# Patient Record
Sex: FEMALE | Race: WHITE | NOT HISPANIC OR LATINO | Employment: FULL TIME | URBAN - NONMETROPOLITAN AREA
[De-identification: names, ages, dates, MRNs, and addresses within clinical notes are randomized per-mention and may not be internally consistent; named-entity substitution may affect disease eponyms.]

---

## 2017-05-08 ENCOUNTER — APPOINTMENT (OUTPATIENT)
Dept: GENERAL RADIOLOGY | Facility: HOSPITAL | Age: 62
End: 2017-05-08

## 2017-05-08 ENCOUNTER — HOSPITAL ENCOUNTER (EMERGENCY)
Facility: HOSPITAL | Age: 62
Discharge: HOME | End: 2017-05-08
Attending: EMERGENCY MEDICINE | Admitting: EMERGENCY MEDICINE

## 2017-05-08 VITALS
HEART RATE: 59 BPM | TEMPERATURE: 98.4 F | BODY MASS INDEX: 28.12 KG/M2 | HEIGHT: 66 IN | DIASTOLIC BLOOD PRESSURE: 56 MMHG | WEIGHT: 175 LBS | RESPIRATION RATE: 20 BRPM | OXYGEN SATURATION: 95 % | SYSTOLIC BLOOD PRESSURE: 107 MMHG

## 2017-05-08 DIAGNOSIS — S83.92XA SPRAIN OF KNEE/LEG, LEFT, INITIAL ENCOUNTER: Primary | ICD-10-CM

## 2017-05-08 DIAGNOSIS — S82.832A CLOSED FRACTURE OF PROXIMAL END OF LEFT FIBULA, UNSPECIFIED FRACTURE MORPHOLOGY, INITIAL ENCOUNTER: ICD-10-CM

## 2017-05-08 PROCEDURE — 99284 EMERGENCY DEPT VISIT MOD MDM: CPT

## 2017-05-08 PROCEDURE — 73564 X-RAY EXAM KNEE 4 OR MORE: CPT

## 2017-05-08 RX ORDER — IBUPROFEN 600 MG/1
600 TABLET ORAL ONCE
Status: COMPLETED | OUTPATIENT
Start: 2017-05-08 | End: 2017-05-08

## 2017-05-08 RX ORDER — IBUPROFEN 600 MG/1
600 TABLET ORAL EVERY 8 HOURS PRN
Qty: 30 TABLET | Refills: 0 | Status: SHIPPED | OUTPATIENT
Start: 2017-05-08

## 2017-05-08 RX ADMIN — IBUPROFEN 600 MG: 600 TABLET ORAL at 22:16

## 2017-05-26 ENCOUNTER — HOSPITAL ENCOUNTER (OUTPATIENT)
Dept: PHYSICAL THERAPY | Facility: HOSPITAL | Age: 62
Discharge: HOME | End: 2017-05-26
Admitting: PHYSICAL THERAPIST

## 2017-05-26 DIAGNOSIS — S83.512A LEFT ACL TEAR: Primary | ICD-10-CM

## 2017-05-26 DIAGNOSIS — S82.832D CLOSED FRACTURE FIBULA, HEAD, LEFT, WITH ROUTINE HEALING, SUBSEQUENT ENCOUNTER: ICD-10-CM

## 2017-05-26 PROCEDURE — 97110 THERAPEUTIC EXERCISES: CPT | Performed by: PHYSICAL THERAPIST

## 2017-05-26 PROCEDURE — G0283 ELEC STIM OTHER THAN WOUND: HCPCS | Performed by: PHYSICAL THERAPIST

## 2017-05-26 PROCEDURE — 97161 PT EVAL LOW COMPLEX 20 MIN: CPT | Performed by: PHYSICAL THERAPIST

## 2017-05-30 ENCOUNTER — HOSPITAL ENCOUNTER (OUTPATIENT)
Dept: PHYSICAL THERAPY | Facility: HOSPITAL | Age: 62
Setting detail: THERAPIES SERIES
Discharge: HOME | End: 2017-05-30

## 2017-05-30 DIAGNOSIS — S82.832D CLOSED FRACTURE FIBULA, HEAD, LEFT, WITH ROUTINE HEALING, SUBSEQUENT ENCOUNTER: ICD-10-CM

## 2017-05-30 DIAGNOSIS — S83.512A LEFT ACL TEAR: Primary | ICD-10-CM

## 2017-05-30 PROCEDURE — G0283 ELEC STIM OTHER THAN WOUND: HCPCS

## 2017-05-30 PROCEDURE — 97110 THERAPEUTIC EXERCISES: CPT

## 2017-05-31 ENCOUNTER — TRANSCRIBE ORDERS (OUTPATIENT)
Dept: PHYSICAL THERAPY | Facility: HOSPITAL | Age: 62
End: 2017-05-31

## 2017-05-31 DIAGNOSIS — S82.832A CLOSED FRACTURE OF DISTAL END OF FIBULA WITH TIBIA, LEFT, INITIAL ENCOUNTER: ICD-10-CM

## 2017-05-31 DIAGNOSIS — S83.512A SPRAIN OF ANTERIOR CRUCIATE LIGAMENT OF LEFT KNEE, INITIAL ENCOUNTER: Primary | ICD-10-CM

## 2017-05-31 DIAGNOSIS — S82.302A CLOSED FRACTURE OF DISTAL END OF FIBULA WITH TIBIA, LEFT, INITIAL ENCOUNTER: ICD-10-CM

## 2017-06-01 ENCOUNTER — HOSPITAL ENCOUNTER (OUTPATIENT)
Dept: PHYSICAL THERAPY | Facility: HOSPITAL | Age: 62
Setting detail: THERAPIES SERIES
Discharge: HOME | End: 2017-06-01

## 2017-06-01 DIAGNOSIS — S83.512A LEFT ACL TEAR: Primary | ICD-10-CM

## 2017-06-01 DIAGNOSIS — S82.832D CLOSED FRACTURE FIBULA, HEAD, LEFT, WITH ROUTINE HEALING, SUBSEQUENT ENCOUNTER: ICD-10-CM

## 2017-06-01 PROCEDURE — G0283 ELEC STIM OTHER THAN WOUND: HCPCS

## 2017-06-01 PROCEDURE — 97110 THERAPEUTIC EXERCISES: CPT

## 2017-06-01 NOTE — THERAPY TREATMENT NOTE
Outpatient Physical Therapy Ortho Treatment Note  [facility]     Patient Name: Silvia Watt  : 1955  MRN: [id number]  Today's Date: 2017      Visit Date: 2017  Visits 3/3. Jose Cruz 17. MD jolley 17.   Visit Dx:    ICD-10-CM ICD-9-CM   1. Left ACL tear S83.512A 844.2   2. Closed fracture fibula, head, left, with routine healing, subsequent encounter S82.832D V54.16       There is no problem list on file for this patient.       No past medical history on file.     No past surgical history on file.          PT Ortho       17 0800    Subjective Comments    Subjective Comments Pt reports no knee pain. Mild increased symptoms post prev treatment. She is doing HEP.   -JW    Precautions and Contraindications    Precautions/Limitations non-weight bearing status  -JW    Subjective Pain    Able to rate subjective pain? yes  -JW    Pre-Treatment Pain Level 0  -JW    Post-Treatment Pain Level 0  -JW    Posture/Observations    Posture/Observations Comments NWB. Amb with B crutches. Minimal ext lag with SLR.   -JW      17 1400    Subjective Comments    Subjective Comments Patient states that she will have surgery to repair her ACL  -CP    Precautions and Contraindications    Precautions/Limitations non-weight bearing status  -CP    Subjective Pain    Able to rate subjective pain? yes  -CP    Pre-Treatment Pain Level 2  -CP    Posture/Observations    Posture/Observations Comments NWB with crutches  -CP      User Key  (r) = Recorded By, (t) = Taken By, (c) = Cosigned By    Initials Name Provider Type     Michael Nichole PTA Physical Therapy Assistant    KENISHA Renteria PTA Physical Therapy Assistant                            PT Assessment/Plan       17 0800       PT Assessment    Functional Limitations Impaired gait;Decreased safety during functional activities;Limitation in home management;Performance in work activities;Performance in leisure activities;Limitations  "in community activities  -JW     Impairments Gait;Balance;Endurance;Muscle strength;Pain;Range of motion  -JW     Assessment Comments Pt is loki PT well thus far. Good loki of TE changes today. One goal met.    -JW     PT Plan    PT Frequency 2x/week  -JW     Predicted Duration of Therapy Intervention (days/wks) 4 weeks  -JW     PT Plan Comments Cont PT per POC.   -JW       User Key  (r) = Recorded By, (t) = Taken By, (c) = Cosigned By    Initials Name Provider Type    JUNIOR Nichole PTA Physical Therapy Assistant                Modalities       06/01/17 0900          Ice    Ice Applied Yes  -JW      Location left knee  -JW      Rx Minutes 15 mins  -JW      Ice S/P Rx Yes  -JW      ELECTRICAL STIMULATION    Attended/Unattended Unattended  -JW      Stimulation Type IFC  -JW      Location/Electrode Placement/Other left knee  -JW      Rx Minutes 15 mins  -JW        User Key  (r) = Recorded By, (t) = Taken By, (c) = Cosigned By    Initials Name Provider Type    JUNIOR Nichole PTA Physical Therapy Assistant                Exercises       06/01/17 0800          Subjective Comments    Subjective Comments Pt reports no knee pain. Mild increased symptoms post prev treatment. She is doing HEP.   -JW      Subjective Pain    Able to rate subjective pain? yes  -JW      Pre-Treatment Pain Level 0  -JW      Post-Treatment Pain Level 0  -JW      Exercise 1    Exercise Name 1 Pro II for ROM with UE/LE  -JW      Time (Minutes) 1 10  -JW      Exercise 2    Exercise Name 2 QS  -JW      Reps 2 20  -JW      Exercise 3    Exercise Name 3 SAQ  -JW      Reps 3 30  -JW      Exercise 4    Exercise Name 4 SLR  -JW      Reps 4 30  -JW      Exercise 5    Exercise Name 5 S/L hip abd  -JW      Reps 5 30  -JW      Exercise 6    Exercise Name 6 Prone knee flex stretch  -JW      Sets 6 2  -JW      Time (Seconds) 6 30\"  -JW      Exercise 7    Exercise Name 7 Prone HS curls  -JW      Reps 7 30  -JW      Exercise 8    Exercise Name 8 Hip add " squeeze  -JW      Reps 8 30  -JW      Exercise 9    Exercise Name 9 PF  -JW      Equipment 9 Theraband  -JW      Resistance 9 Green  -JW      Reps 9 30  -JW        User Key  (r) = Recorded By, (t) = Taken By, (c) = Cosigned By    Initials Name Provider Type     Michael Nichole PTA Physical Therapy Assistant                               PT OP Goals       06/01/17 0800       PT Short Term Goals    STG Date to Achieve 06/16/17  -     STG 1 Patient will be independent in home exercise program  -     STG 1 Progress Progressing  -JW     STG 2 Patient will have 120° flexion  -     STG 2 Progress Met  -JW     STG 3 Patient will independent straight leg raise without extensor lag  -     STG 3 Progress Progressing  -JW     STG 4 Patient will have 1 out of 10 resting pain  -     STG 4 Progress Progressing  -     STG 5 Patient will be able to increase weightbearing activities  -     STG 5 Progress Not Met  -     Long Term Goals    LTG Date to Achieve 07/07/17  -     LTG 1 Patient will be able to ambulate without an assistive device  -     LTG 1 Progress Not Met  -JW     LTG 2 Patient will have 135° flexion of the left knee  -     LTG 2 Progress Not Met  -JW     LTG 3 Patient have 0 resting pain  -     LTG 3 Progress Not Met  -     LTG 4 Patient will have minimal increase in swelling by end of day  -     LTG 4 Progress Not Met  -JW     LTG 5 Patient independent in exercise program  -     LTG 5 Progress Ongoing  -       User Key  (r) = Recorded By, (t) = Taken By, (c) = Cosigned By    Initials Name Provider Type     Michael Nichole PTA Physical Therapy Assistant                    Time Calculation:   Start Time: 0800  Stop Time: 0904  Time Calculation (min): 64 min  Total Timed Code Minutes- PT: 64 minute(s)    Therapy Charges for Today     Code Description Service Date Service Provider Modifiers Qty    88762925771 HC PT THER PROC EA 15 MIN 6/1/2017 Michael Nichole PTA GP 3    [id number]  HC PT ELECTRICAL STIM UNATTENDED 6/1/2017 Michael Nichole, PTA  1                    Michael Nichole, PTA  6/1/2017

## 2017-06-02 ENCOUNTER — HOSPITAL ENCOUNTER (OUTPATIENT)
Dept: PHYSICAL THERAPY | Facility: HOSPITAL | Age: 62
Setting detail: THERAPIES SERIES
Discharge: HOME | End: 2017-06-02

## 2017-06-02 DIAGNOSIS — S82.832D CLOSED FRACTURE FIBULA, HEAD, LEFT, WITH ROUTINE HEALING, SUBSEQUENT ENCOUNTER: ICD-10-CM

## 2017-06-02 DIAGNOSIS — S83.512A LEFT ACL TEAR: Primary | ICD-10-CM

## 2017-06-02 PROCEDURE — G0283 ELEC STIM OTHER THAN WOUND: HCPCS

## 2017-06-02 PROCEDURE — 97110 THERAPEUTIC EXERCISES: CPT

## 2017-06-02 NOTE — THERAPY TREATMENT NOTE
Outpatient Physical Therapy Ortho Treatment Note  [facility]     Patient Name: Silvia Watt  : 1955  MRN: [id number]  Today's Date: 2017      Visit Date: 2017     Sub imp pt. Unsure  Visit  (30)  Re 17  MD 17    Visit Dx:    ICD-10-CM ICD-9-CM   1. Left ACL tear S83.512A 844.2   2. Closed fracture fibula, head, left, with routine healing, subsequent encounter S82.832D V54.16       There is no problem list on file for this patient.       No past medical history on file.     No past surgical history on file.          PT Ortho       17 0800    Subjective Comments    Subjective Comments Pt reports no knee pain. Mild increased symptoms post prev treatment. She is doing HEP.   -    Precautions and Contraindications    Precautions/Limitations non-weight bearing status  -JW    Subjective Pain    Able to rate subjective pain? yes  -    Pre-Treatment Pain Level 0  -JW    Post-Treatment Pain Level 0  -JW    Posture/Observations    Posture/Observations Comments NWB. Amb with B crutches. Minimal ext lag with SLR.   -JW      17 1400    Subjective Comments    Subjective Comments Patient states that she will have surgery to repair her ACL  -CP    Precautions and Contraindications    Precautions/Limitations non-weight bearing status  -CP    Subjective Pain    Able to rate subjective pain? yes  -CP    Pre-Treatment Pain Level 2  -CP    Posture/Observations    Posture/Observations Comments NWB with crutches  -CP      User Key  (r) = Recorded By, (t) = Taken By, (c) = Cosigned By    Initials Name Provider Type     Michael Nichole, IZA Physical Therapy Assistant    KENISHA Renteria PTA Physical Therapy Assistant                            PT Assessment/Plan       17 1150       PT Assessment    Assessment Comments (P)  completes rx without c/o increased s/s  -ESTEE     PT Plan    PT Plan Comments (P)  cont per poc. progress LE strength, gait as loki  -ESTEE       User  Key  (r) = Recorded By, (t) = Taken By, (c) = Cosigned By    Initials Name Provider Type    ESTEE Jac[facility], [facility]                 Modalities       06/02/17 1000          Ice    Location (P)  left knee  -ESTEE      Rx Minutes (P)  15 mins  -ESTEE      Ice S/P Rx (P)  Yes  -ESTEE      ELECTRICAL STIMULATION    Attended/Unattended (P)  Unattended  -ESTEE      Stimulation Type (P)  IFC  -ESTEE      Location/Electrode Placement/Other (P)  left knee  -ESTEE      Rx Minutes (P)  15 mins  -ESTEE        User Key  (r) = Recorded By, (t) = Taken By, (c) = Cosigned By    Initials Name Provider Type    ESTEE Jacome [facility]                 Exercises       06/02/17 1000          Subjective Comments    Subjective Comments (P)  Reports knee is achy today. sore from yesterday rx.   -ESTEE      Subjective Pain    Able to rate subjective pain? (P)  yes  -ESTEE      Pre-Treatment Pain Level (P)  2  -ESTEE      Post-Treatment Pain Level (P)  2  -ESTEE      Exercise 1    Exercise Name 1 (P)  Pro II seat 11 L1  -ESTEE      Time (Minutes) 1 (P)  10  -ESTEE      Exercise 2    Exercise Name 2 (P)  QS  -ESTEE      Reps 2 (P)  20  -ESTEE      Exercise 3    Exercise Name 3 (P)  SAQ  -ESTEE      Reps 3 (P)  30  -ESTEE      Exercise 4    Exercise Name 4 (P)  SLR  -ESTEE      Reps 4 (P)  30  -ESTEE      Exercise 5    Exercise Name 5 (P)  S/L hip abd  -ESTEE      Reps 5 (P)  30  -ESTEE      Exercise 6    Exercise Name 6 (P)  prone tke  -ESTEE      Sets 6 (P)  --  -ESTEE      Reps 6 (P)  20  -ESTEE      Time (Seconds) 6 (P)  --  -ESTEE      Exercise 7    Exercise Name 7 (P)  Ham ball iso  -ESTEE      Reps 7 (P)  20  -ESTEE      Exercise 8    Exercise Name 8 (P)  Hip add squeeze  -ESTEE      Reps 8 (P)  30  -ESTEE      Exercise 9    Exercise Name 9 (P)  --  -ESTEE      Equipment 9 (P)  --  -ESTEE      Resistance 9 (P)  --  -ESTEE      Reps 9 (P)  --  -ESTEE      Exercise 10    Exercise Name 10 (P)  standing ham stretch  -ESTEE      Sets 10 (P)  3  -ESTEE      Time (Seconds) 10 (P)  30  -ESTEE      Exercise 11    Exercise Name  11 (P)  incline stretch  -ESTEE      Sets 11 (P)  3  -ESTEE      Time (Seconds) 11 (P)  30  -ESTEE        User Key  (r) = Recorded By, (t) = Taken By, (c) = Cosigned By    Initials Name Provider Type    ESTEE Jacome [facility]                                PT OP Goals       06/02/17 1000       PT Short Term Goals    STG Date to Achieve (P)  06/16/17  -ESTEE     STG 1 (P)  Patient will be independent in home exercise program  -ESTEE     STG 1 Progress (P)  Progressing  -ESTEE     STG 2 (P)  Patient will have 120° flexion  -ESTEE     STG 2 Progress (P)  Met  -ESTEE     STG 3 (P)  Patient will independent straight leg raise without extensor lag  -ESTEE     STG 3 Progress (P)  Progressing  -ESTEE     STG 4 (P)  Patient will have 1 out of 10 resting pain  -ESTEE     STG 4 Progress (P)  Progressing  -ESTEE     STG 5 (P)  Patient will be able to increase weightbearing activities  -ESTEE     STG 5 Progress (P)  Not Met  -ESTEE     Long Term Goals    LTG Date to Achieve (P)  07/07/17  -ESTEE     LTG 1 (P)  Patient will be able to ambulate without an assistive device  -ESTEE     LTG 1 Progress (P)  Not Met  -ESTEE     LTG 2 (P)  Patient will have 135° flexion of the left knee  -ESTEE     LTG 2 Progress (P)  Not Met  -ESTEE     LTG 3 (P)  Patient have 0 resting pain  -ESTEE     LTG 3 Progress (P)  Not Met  -ESTEE     LTG 4 (P)  Patient will have minimal increase in swelling by end of day  -ESTEE     LTG 4 Progress (P)  Not Met  -ESTEE     LTG 5 (P)  Patient independent in exercise program  -ESTEE     LTG 5 Progress (P)  Ongoing  -ESTEE       User Key  (r) = Recorded By, (t) = Taken By, (c) = Cosigned By    Initials Name Provider Type    ESTEE Jacome [facility]                     Time Calculation:   Start Time: (P) 1059  Stop Time: (P) 1203  Time Calculation (min): (P) 64 min  Total Timed Code Minutes- PT: (P) 49 minute(s)    Therapy Charges for Today     Code Description Service Date Service Provider Modifiers Qty    50743405670 HC PT THER SUPP EA 15 MIN 6/2/2017 Semaj IVERSON  Ayaz ATC  1    90371504220 HC PT ELECTRICAL STIM UNATTENDED 6/2/2017 Semaj Jacome ATC  1    43675100072 HC PT THER PROC EA 15 MIN 6/2/2017 Semaj Jacome, ATC  3                    Semaj Jacome ATC  6/2/2017

## 2017-06-05 ENCOUNTER — HOSPITAL ENCOUNTER (OUTPATIENT)
Dept: PHYSICAL THERAPY | Facility: HOSPITAL | Age: 62
Setting detail: THERAPIES SERIES
Discharge: HOME | End: 2017-06-05

## 2017-06-05 DIAGNOSIS — S83.512A LEFT ACL TEAR: Primary | ICD-10-CM

## 2017-06-05 PROCEDURE — G0283 ELEC STIM OTHER THAN WOUND: HCPCS

## 2017-06-05 PROCEDURE — 97110 THERAPEUTIC EXERCISES: CPT

## 2017-06-05 NOTE — THERAPY TREATMENT NOTE
Outpatient Physical Therapy Ortho Treatment Note  [facility]     Patient Name: Silvia Watt  : 1955  MRN: [id number]  Today's Date: 2017      Visit Date: 2017  Pt.  Has attended 5/5 visits  Some subjective improvement  Jose Cruz 17  MD 17    Visit Dx:    ICD-10-CM ICD-9-CM   1. Left ACL tear S83.512A 844.2       There is no problem list on file for this patient.       No past medical history on file.     No past surgical history on file.                          PT Assessment/Plan       17 0845       PT Assessment    Assessment Comments did well with incline S and toe raises some cues for minisquat correct completion but needed to stop the weight shifts due to increased pain.  Pt noted that it popped and her knee hurt.    -SP     PT Plan    PT Frequency 2x/week  -SP     PT Plan Comments continue with POC  monitor response with WB work and slow progression with CKC activities may hold on these (weight shifts)  -SP       User Key  (r) = Recorded By, (t) = Taken By, (c) = Cosigned By    Initials Name Provider Type    SP So Carcamo PTA Physical Therapy Assistant                Modalities       17 0800          Subjective Pain    Post-Treatment Pain Level 4  -SP      Ice    Ice Applied Yes  -SP      Location left knee  -SP      Rx Minutes Other:  -SP      Ice S/P Rx Yes  -SP      ELECTRICAL STIMULATION    Attended/Unattended Unattended  -SP      Stimulation Type IFC  -SP      Location/Electrode Placement/Other left knee  -SP      Rx Minutes --   20 min  -SP        User Key  (r) = Recorded By, (t) = Taken By, (c) = Cosigned By    Initials Name Provider Type    CLEVE Carcamo PTA Physical Therapy Assistant                Exercises       17 0800          Subjective Comments    Subjective Comments Notes she wears her brace when she is up uses B crutches  -SP      Subjective Pain    Able to rate subjective pain? yes  -SP      Pre-Treatment Pain Level 0   -SP      Post-Treatment Pain Level 4  -SP      Exercise 1    Exercise Name 1 Pro 2 seat 11 Level 2  -SP      Time (Minutes) 1 10 min  -SP      Exercise 2    Exercise Name 2 Incline S  -SP      Reps 2 3  -SP      Time (Seconds) 2 30 sec  -SP      Exercise 3    Exercise Name 3 toe raises  -SP      Sets 3 2  -SP      Reps 3 10  -SP      Exercise 4    Exercise Name 4 minisquats  -SP      Sets 4 2  -SP      Reps 4 10  -SP      Exercise 5    Exercise Name 5 attempted weight shifts but after 5 pain increased and this activity was stoped  -SP      Reps 5 5  -SP        User Key  (r) = Recorded By, (t) = Taken By, (c) = Cosigned By    Initials Name Provider Type    CLEVE Carcamo, PTA Physical Therapy Assistant                               PT OP Goals       06/05/17 0857 06/05/17 0800    PT Short Term Goals    STG Date to Achieve  06/16/17  -SP    STG 1  Patient will be independent in home exercise program  -SP    STG 1 Progress  Progressing  -SP    STG 2  Patient will have 120° flexion  -SP    STG 2 Progress  Met  -SP    STG 3  Patient will independent straight leg raise without extensor lag  -SP    STG 3 Progress  Progressing  -SP    STG 4  Patient will have 1 out of 10 resting pain  -SP    STG 4 Progress  Met  -SP    STG 5  Patient will be able to increase weightbearing activities  -SP    STG 5 Progress  Not Met  -SP    Long Term Goals    LTG Date to Achieve  07/07/17  -SP    LTG 1  Patient will be able to ambulate without an assistive device  -SP    LTG 1 Progress  Not Met  -SP    LTG 2  Patient will have 135° flexion of the left knee  -SP    LTG 2 Progress  Not Met  -SP    LTG 3  Patient have 0 resting pain  -SP    LTG 3 Progress  Progressing  -SP    LTG 4  Patient will have minimal increase in swelling by end of day  -SP    LTG 4 Progress  Not Met  -SP    LTG 5  Patient independent in exercise program  -SP    LTG 5 Progress  Ongoing  -SP    Time Calculation    PT Goal Re-Cert Due Date 06/22/17  -SP 06/16/17  -SP       User Key  (r) = Recorded By, (t) = Taken By, (c) = Cosigned By    Initials Name Provider Type    SP So Carcamo PTA Physical Therapy Assistant                    Time Calculation:   Start Time: 0800  Stop Time: 0900  Time Calculation (min): 60 min  PT Non-Billable Time (min): 20 min  Total Timed Code Minutes- PT: 40 minute(s)    Therapy Charges for Today     Code Description Service Date Service Provider Modifiers Qty    08444809227 HC PT THER SUPP EA 15 MIN 6/5/2017 So Carcamo PTA GP 1    11171905385 HC PT ELECTRICAL STIM UNATTENDED 6/5/2017 So Carcamo, PTA  1    90493113906 HC PT THER PROC EA 15 MIN 6/5/2017 So Carcamo PTA GP 2                    So Carcamo PTA  6/5/2017

## 2017-06-07 ENCOUNTER — HOSPITAL ENCOUNTER (OUTPATIENT)
Dept: PHYSICAL THERAPY | Facility: HOSPITAL | Age: 62
Setting detail: THERAPIES SERIES
Discharge: HOME | End: 2017-06-07

## 2017-06-07 DIAGNOSIS — S83.512A LEFT ACL TEAR: Primary | ICD-10-CM

## 2017-06-07 PROCEDURE — G0283 ELEC STIM OTHER THAN WOUND: HCPCS

## 2017-06-07 PROCEDURE — 97110 THERAPEUTIC EXERCISES: CPT

## 2017-06-07 NOTE — THERAPY TREATMENT NOTE
Outpatient Physical Therapy Ortho Treatment Note  [facility]     Patient Name: Silvia Watt  : 1955  MRN: [id number]  Today's Date: 2017      Visit Date: 2017  Pt. Has attended 6/6 visits  Recert 17  Subjective improvement 20 percent  MD 17  Visit Dx:    ICD-10-CM ICD-9-CM   1. Left ACL tear S83.512A 844.2       There is no problem list on file for this patient.       No past medical history on file.     No past surgical history on file.                          PT Assessment/Plan       17 0847       PT Assessment    Assessment Comments Tolerates limited CKC work well and ROM is slowly increasing  Remains guarded but notes subjective improvement  -SP     PT Plan    PT Frequency 2x/week  -SP     PT Plan Comments Continue with POC increase CKC work as tolerted  -SP       User Key  (r) = Recorded By, (t) = Taken By, (c) = Cosigned By    Initials Name Provider Type    SP So Carcamo PTA Physical Therapy Assistant                Modalities       17 0800          Subjective Pain    Post-Treatment Pain Level 1  -SP      Ice    Ice Applied Yes  -SP      Location left knee  -SP      Rx Minutes --   20 min  -SP      Ice S/P Rx Yes  -SP      ELECTRICAL STIMULATION    Attended/Unattended Unattended  -SP      Stimulation Type IFC  -SP      Location/Electrode Placement/Other left knee  -SP      Rx Minutes 15 mins  -SP        User Key  (r) = Recorded By, (t) = Taken By, (c) = Cosigned By    Initials Name Provider Type    SP So Carcamo PTA Physical Therapy Assistant                Exercises       17 0800          Subjective Comments    Subjective Comments Did ok after last session could feel it but no continued pain   -SP      Subjective Pain    Able to rate subjective pain? yes  -SP      Pre-Treatment Pain Level 1  -SP      Post-Treatment Pain Level 1  -SP      Exercise 1    Exercise Name 1 Pro 2 seat 11 Level2  -SP      Time (Minutes) 1 10 min  -SP       Exercise 2    Exercise Name 2 Incline S, HS S  -SP      Reps 2 3  -SP      Time (Seconds) 2 30 sec  -SP      Exercise 3    Exercise Name 3 toe raises  -SP      Sets 3 2  -SP      Reps 3 10  -SP      Exercise 4    Exercise Name 4 minisquats  -SP      Sets 4 2  -SP      Reps 4 10  -SP      Exercise 5    Exercise Name 5 SLR flexion  -SP      Reps 5 20  -SP      Exercise 6    Exercise Name 6 seated ham sets into tball  -SP      Reps 6 15  -SP      Time (Seconds) 6 10 sec   -SP      Exercise 7    Exercise Name 7 prone knee flexion  -SP      Reps 7 20  -SP      Exercise 8    Exercise Name 8 prone hip ext  -SP      Reps 8 20  -SP      Exercise 9    Exercise Name 9 heel slides  -SP      Reps 9 15  -SP      Exercise 10    Exercise Name 10 bridge with add   -SP      Reps 10 15  -SP        User Key  (r) = Recorded By, (t) = Taken By, (c) = Cosigned By    Initials Name Provider Type    CLEVE Carcamo, PTA Physical Therapy Assistant                               PT OP Goals       06/07/17 0848 06/07/17 0800    PT Short Term Goals    STG Date to Achieve  06/16/17  -SP    STG 1  Patient will be independent in home exercise program  -SP    STG 1 Progress  Progressing  -SP    STG 2  Patient will have 120° flexion  -SP    STG 2 Progress  Met  -SP    STG 2 Progress Comments  AROM 0-122  -SP    STG 3  Patient will independent straight leg raise without extensor lag  -SP    STG 3 Progress  Met  -SP    STG 4  Patient will have 1 out of 10 resting pain  -SP    STG 4 Progress  Met  -SP    STG 5  Patient will be able to increase weightbearing activities  -SP    STG 5 Progress  Not Met  -SP    Long Term Goals    LTG Date to Achieve  07/07/17  -SP    LTG 1  Patient will be able to ambulate without an assistive device  -SP    LTG 1 Progress  Not Met  -SP    LTG 2  Patient will have 135° flexion of the left knee  -SP    LTG 2 Progress  Not Met  -SP    LTG 3  Patient have 0 resting pain  -SP    LTG 3 Progress  Progressing  -SP    LTG 4   Patient will have minimal increase in swelling by end of day  -SP    LTG 4 Progress  Progressing  -SP    LTG 5  Patient independent in exercise program  -SP    LTG 5 Progress  Ongoing  -SP    Time Calculation    PT Goal Re-Cert Due Date 06/22/17  -SP 06/22/17  -SP      User Key  (r) = Recorded By, (t) = Taken By, (c) = Cosigned By    Initials Name Provider Type    SP So Carcamo PTA Physical Therapy Assistant                    Time Calculation:   Start Time: 0800  Stop Time: 0900  Time Calculation (min): 60 min  PT Non-Billable Time (min): 20 min  Total Timed Code Minutes- PT: 40 minute(s)    Therapy Charges for Today     Code Description Service Date Service Provider Modifiers Qty    70547156231 HC PT THER SUPP EA 15 MIN 6/7/2017 So Carcamo PTA GP 1    94296285314 HC PT ELECTRICAL STIM UNATTENDED 6/7/2017 So Carcamo PTA  1    37130534031 HC PT THER PROC EA 15 MIN 6/7/2017 So Carcamo PTA GP 3                    So Carcamo PTA  6/7/2017

## 2017-06-12 ENCOUNTER — APPOINTMENT (OUTPATIENT)
Dept: PHYSICAL THERAPY | Facility: HOSPITAL | Age: 62
End: 2017-06-12

## 2017-06-13 ENCOUNTER — HOSPITAL ENCOUNTER (OUTPATIENT)
Dept: PHYSICAL THERAPY | Facility: HOSPITAL | Age: 62
Setting detail: THERAPIES SERIES
Discharge: HOME | End: 2017-06-13

## 2017-06-13 DIAGNOSIS — S83.512A LEFT ACL TEAR: Primary | ICD-10-CM

## 2017-06-13 DIAGNOSIS — S82.832D CLOSED FRACTURE FIBULA, HEAD, LEFT, WITH ROUTINE HEALING, SUBSEQUENT ENCOUNTER: ICD-10-CM

## 2017-06-13 PROCEDURE — G0283 ELEC STIM OTHER THAN WOUND: HCPCS

## 2017-06-13 PROCEDURE — 97110 THERAPEUTIC EXERCISES: CPT

## 2017-06-13 NOTE — THERAPY TREATMENT NOTE
Outpatient Physical Therapy Ortho Treatment Note  [facility]     Patient Name: Silvia Watt  : 1955  MRN: [id number]  Today's Date: 2017      Visit Date: 2017   Subjective Improvement 20%  Visits 7/8  Visits approved 30 per year  [id number]  Recert Date 2017    Left ACL tear with closed fibula head Fracture      Visit Dx:    ICD-10-CM ICD-9-CM   1. Left ACL tear S83.512A 844.2   2. Closed fracture fibula, head, left, with routine healing, subsequent encounter S82.832D V54.16       There is no problem list on file for this patient.       No past medical history on file.     No past surgical history on file.          PT Ortho       17 0800    Posture/Observations    Posture/Observations Comments WBAT amb with bilateral crutches.  Wearing knee brace  -CP    ROM (Range of Motion)    General ROM Detail L. AROM 0-122  -CP    Gait Assessment/Treatment    Gait, [place] Level conditional independence  -CP    Gait, Assistive Device axillary crutches  -CP      User Key  (r) = Recorded By, (t) = Taken By, (c) = Cosigned By    Initials Name Provider Type    CP Gabriela Renteria PTA Physical Therapy Assistant                            PT Assessment/Plan       17 0856       PT Assessment    Assessment Comments tolerated RX very well.  slight increase pain with wt. bearing activities  -CP     PT Plan    PT Frequency 2x/week  -CP     Predicted Duration of Therapy Intervention (days/wks) 4 weeks  -CP     PT Plan Comments Cont with POC.  progressing as tolerated  -CP       User Key  (r) = Recorded By, (t) = Taken By, (c) = Cosigned By    Initials Name Provider Type    CP Gabriela Renteria PTA Physical Therapy Assistant                Modalities       17 0800          Subjective Pain    Post-Treatment Pain Level 0  -CP      Ice    Ice Applied Yes  -CP      Location left knee  -CP      Rx Minutes 15 mins  -CP      Ice S/P Rx Yes  -CP      ELECTRICAL STIMULATION     Attended/Unattended Unattended  -CP      Stimulation Type IFC  -CP      Location/Electrode Placement/Other left  -CP      Rx Minutes 15 mins  -CP        User Key  (r) = Recorded By, (t) = Taken By, (c) = Cosigned By    Initials Name Provider Type    CP Gabriela Renteria PTA Physical Therapy Assistant                Exercises       06/13/17 0800          Subjective Comments    Subjective Comments Patient states that she is just now able to put weight on left leg  -CP      Subjective Pain    Able to rate subjective pain? yes  -CP      Pre-Treatment Pain Level 1  -CP      Post-Treatment Pain Level 0  -CP      Exercise 1    Exercise Name 1 Pro II level 2   -CP      Time (Minutes) 1 10  -CP      Exercise 2    Exercise Name 2 Incline, HS Stretch  -CP      Sets 2 3  -CP      Time (Seconds) 2 30  -CP      Exercise 3    Exercise Name 3 Heel raises  -CP      Sets 3 2  -CP      Reps 3 10  -CP      Exercise 4    Exercise Name 4 mini Squats  -CP      Sets 4 2  -CP      Reps 4 10  -CP      Exercise 5    Exercise Name 5 TKE  -CP      Equipment 5 Theraband  -CP      Resistance 5 Red  -CP      Sets 5 2  -CP      Reps 5 10  -CP      Exercise 6    Exercise Name 6 Ham curls   -CP      Equipment 6 Theraband  -CP      Resistance 6 Red  -CP      Sets 6 2  -CP      Reps 6 10  -CP      Exercise 7    Exercise Name 7 Hooklying leg press  -CP      Equipment 7 Theraband  -CP      Resistance 7 Green  -CP      Sets 7 2  -CP      Reps 7 10  -CP      Exercise 8    Exercise Name 8 SLR   -CP      Sets 8 3  -CP      Reps 8 10  -CP      Exercise 9    Exercise Name 9 Supine hip ab/ad  -CP      Sets 9 3  -CP      Reps 9 10  -CP      Exercise 10    Exercise Name 10 Bridge with add squeeze  -CP      Sets 10 2  -CP      Reps 10 10  -CP      Exercise 11    Exercise Name 11 Heelslides with strap  -CP      Reps 11 30  -CP        User Key  (r) = Recorded By, (t) = Taken By, (c) = Cosigned By    Initials Name Provider Type    CP Gabriela Renteria PTA Physical  Therapy Assistant                               PT OP Goals       06/13/17 0800       PT Short Term Goals    STG Date to Achieve 06/16/17  -CP     STG 1 Patient will be independent in home exercise program  -CP     STG 1 Progress Progressing  -CP     STG 2 Patient will have 120° flexion  -CP     STG 2 Progress Met  -CP     STG 3 Patient will independent straight leg raise without extensor lag  -CP     STG 3 Progress Met  -CP     STG 4 Patient will have 1 out of 10 resting pain  -CP     STG 4 Progress Met  -CP     STG 5 Patient will be able to increase weightbearing activities  -CP     STG 5 Progress Not Met  -CP     Long Term Goals    LTG Date to Achieve 07/07/17  -CP     LTG 1 Patient will be able to ambulate without an assistive device  -CP     LTG 1 Progress Not Met  -CP     LTG 2 Patient will have 135° flexion of the left knee  -CP     LTG 2 Progress Not Met  -CP     LTG 3 Patient have 0 resting pain  -CP     LTG 3 Progress Progressing  -CP     LTG 4 Patient will have minimal increase in swelling by end of day  -CP     LTG 4 Progress Progressing  -CP     LTG 5 Patient independent in exercise program  -CP     LTG 5 Progress Ongoing  -CP     Time Calculation    PT Goal Re-Cert Due Date 06/22/17  -CP       User Key  (r) = Recorded By, (t) = Taken By, (c) = Cosigned By    Initials Name Provider Type    CP Gabriela Renteria PTA Physical Therapy Assistant                Therapy Education       06/13/17 0855          Therapy Education    Given HEP  -CP      Program Reinforced  -CP      How Provided Verbal;Demonstration  -CP      Provided to Patient  -CP      Level of Understanding Verbalized;Demonstrated  -CP        User Key  (r) = Recorded By, (t) = Taken By, (c) = Cosigned By    Initials Name Provider Type    CP Gabriela Renteria PTA Physical Therapy Assistant                Time Calculation:   Start Time: 0805  Stop Time: 0910  Time Calculation (min): 65 min  Total Timed Code Minutes- PT: 65 minute(s)    Therapy  Charges for Today     Code Description Service Date Service Provider Modifiers Qty    01293750771 HC PT THER PROC EA 15 MIN 6/13/2017 Gabriela Renteria PTA GP 3    08579012786 HC PT ELECTRICAL STIM UNATTENDED 6/13/2017 Gabriela Renteria PTA  1    99175881136 HC PT THER SUPP EA 15 MIN 6/13/2017 Gabriela Renteria PTA GP 1                    Gabriela Renteria PTA  6/13/2017

## 2017-06-14 ENCOUNTER — APPOINTMENT (OUTPATIENT)
Dept: PHYSICAL THERAPY | Facility: HOSPITAL | Age: 62
End: 2017-06-14

## 2017-06-15 ENCOUNTER — HOSPITAL ENCOUNTER (OUTPATIENT)
Dept: PHYSICAL THERAPY | Facility: HOSPITAL | Age: 62
Setting detail: THERAPIES SERIES
Discharge: HOME | End: 2017-06-15

## 2017-06-15 DIAGNOSIS — S82.832D CLOSED FRACTURE FIBULA, HEAD, LEFT, WITH ROUTINE HEALING, SUBSEQUENT ENCOUNTER: ICD-10-CM

## 2017-06-15 DIAGNOSIS — S83.512A LEFT ACL TEAR: Primary | ICD-10-CM

## 2017-06-15 PROCEDURE — 97110 THERAPEUTIC EXERCISES: CPT

## 2017-06-15 PROCEDURE — G0283 ELEC STIM OTHER THAN WOUND: HCPCS

## 2017-06-15 NOTE — THERAPY TREATMENT NOTE
Outpatient Physical Therapy Ortho Treatment Note  [facility]     Patient Name: Silvia Watt  : 1955  MRN: [id number]  Today's Date: 6/15/2017      Visit Date: 06/15/2017     Subjective Improvement 25%  Visits 8/10  Visits approved 30 approved  RTMD 2017  Recert Date 2017    Left ACL Tear with closed fibula Head FX    Visit Dx:    ICD-10-CM ICD-9-CM   1. Left ACL tear S83.512A 844.2   2. Closed fracture fibula, head, left, with routine healing, subsequent encounter S82.832D V54.16       There is no problem list on file for this patient.       No past medical history on file.     No past surgical history on file.          PT Ortho       17 0800    Posture/Observations    Posture/Observations Comments WBAT amb with bilateral crutches.  Wearing knee brace  -CP    ROM (Range of Motion)    General ROM Detail L. AROM 0-122  -CP    Gait Assessment/Treatment    Gait, [place] Level conditional independence  -CP    Gait, Assistive Device axillary crutches  -CP      User Key  (r) = Recorded By, (t) = Taken By, (c) = Cosigned By    Initials Name Provider Type    CP Gabriela Renteria PTA Physical Therapy Assistant                            PT Assessment/Plan       06/15/17 0847       PT Assessment    Assessment Comments Good tolerance to new ex  -CP     PT Plan    PT Frequency 2x/week  -CP     PT Plan Comments Cont with POC. cybex hip AB/AD  -CP       User Key  (r) = Recorded By, (t) = Taken By, (c) = Cosigned By    Initials Name Provider Type    CP Gabriela Renteria PTA Physical Therapy Assistant                Modalities       06/15/17 0800          Ice    Ice Applied Yes  -CP      Location left knee  -CP      Rx Minutes --   20 minutes with ifc  -CP      Ice S/P Rx Yes  -CP      ELECTRICAL STIMULATION    Attended/Unattended Unattended  -CP      Stimulation Type IFC  -CP      Location/Electrode Placement/Other left  -CP      Rx Minutes 20 mins  -CP        User Key  (r) = Recorded  By, (t) = Taken By, (c) = Cosigned By    Initials Name Provider Type    CP Gabriela Renteria PTA Physical Therapy Assistant                Exercises       06/15/17 0800          Subjective Comments    Subjective Comments Patient had no new c/o this date.  States that everything is about the same.  No changes  -CP      Subjective Pain    Able to rate subjective pain? yes  -CP      Pre-Treatment Pain Level 0  -CP      Post-Treatment Pain Level 0  -CP      Exercise 1    Exercise Name 1 Pro II leve 3  -CP      Time (Minutes) 1 10  -CP      Exercise 2    Exercise Name 2 Incline, HS strethc  -CP      Sets 2 3  -CP      Time (Seconds) 2 30  -CP      Exercise 3    Exercise Name 3 Heel raises  -CP      Sets 3 2  -CP      Reps 3 10  -CP      Exercise 4    Exercise Name 4 Standing hip 3 way  -CP      Sets 4 2  -CP      Reps 4 10  -CP      Exercise 5    Exercise Name 5 tke  -CP      Equipment 5 Theraband  -CP      Resistance 5 Red  -CP      Sets 5 2  -CP      Reps 5 10  -CP      Exercise 6    Exercise Name 6 review HEP  -CP        User Key  (r) = Recorded By, (t) = Taken By, (c) = Cosigned By    Initials Name Provider Type    CP Gabriela Renteria PTA Physical Therapy Assistant                                   Therapy Education       06/15/17 0839          Therapy Education    Given HEP   Standing Hip 3 way  -CP      Program New  -CP      How Provided Verbal;Demonstration;Written  -CP      Provided to Patient  -CP      Level of Understanding Verbalized;Demonstrated  -CP        User Key  (r) = Recorded By, (t) = Taken By, (c) = Cosigned By    Initials Name Provider Type    CP Gabriela Renteria PTA Physical Therapy Assistant                Time Calculation:   Start Time: 0803  Stop Time: 0910  Time Calculation (min): 67 min  Total Timed Code Minutes- PT: 65 minute(s)    Therapy Charges for Today     Code Description Service Date Service Provider Modifiers Qty    86447662320 HC PT THER PROC EA 15 MIN 6/15/2017 Gabriela Renteria  PTA GP 3    84893720974 HC PT ELECTRICAL STIM UNATTENDED 6/15/2017 Gabriela Renteria PTA  1    88376406715 HC PT THER SUPP EA 15 MIN 6/15/2017 Gabriela Renteria PTA GP 1                    Gabriela Renteria, IZA  6/15/2017

## 2017-06-19 ENCOUNTER — HOSPITAL ENCOUNTER (OUTPATIENT)
Dept: PHYSICAL THERAPY | Facility: HOSPITAL | Age: 62
Setting detail: THERAPIES SERIES
Discharge: HOME | End: 2017-06-19

## 2017-06-19 DIAGNOSIS — S83.512A LEFT ACL TEAR: Primary | ICD-10-CM

## 2017-06-19 DIAGNOSIS — S82.832D CLOSED FRACTURE FIBULA, HEAD, LEFT, WITH ROUTINE HEALING, SUBSEQUENT ENCOUNTER: ICD-10-CM

## 2017-06-19 PROCEDURE — 97110 THERAPEUTIC EXERCISES: CPT

## 2017-06-19 PROCEDURE — G0283 ELEC STIM OTHER THAN WOUND: HCPCS

## 2017-06-19 NOTE — THERAPY TREATMENT NOTE
Outpatient Physical Therapy Ortho Treatment Note  [facility]     Patient Name: Silvia Watt  : 1955  MRN: [id number]  Today's Date: 2017      Visit Date: 2017     Subjective Improvement: 25%  Attendance:   (30/yr)  Next MD Visit : 17  Recert Date:  17      Therapy Diagnosis:  Left ACL tear w/ closed tibia head fracture        Visit Dx:    ICD-10-CM ICD-9-CM   1. Left ACL tear S83.512A 844.2   2. Closed fracture fibula, head, left, with routine healing, subsequent encounter S82.832D V54.16       There is no problem list on file for this patient.       No past medical history on file.     No past surgical history on file.          PT Ortho       17 1010    Subjective Pain    Post-Treatment Pain Level 0  -KH    Posture/Observations    Posture/Observations Comments gait w/ B crutches; decrease heel-toe pattern.  VC to increase.   -KH    ROM (Range of Motion)    General ROM Detail L AROM 0-122  -      User Key  (r) = Recorded By, (t) = Taken By, (c) = Cosigned By    Initials Name Provider Type    ISIDRA Rowley PTA Physical Therapy Assistant                            PT Assessment/Plan       17 1010       PT Assessment    Assessment Comments Gait improved with VC.  Pt to start amb at home with 1 crutch to initiate increase WB through L LE;  Muslce fatigue after cybex but tolerated well.   -     PT Plan    PT Frequency 2x/week  -ISIDRA     Predicted Duration of Therapy Intervention (days/wks) 4 weeks  -     PT Plan Comments Recheck next visit. Continue to progress WB and gait activites as able.  Increase WB exercises as able also.  MD note next visit.   -ISIDRA       User Key  (r) = Recorded By, (t) = Taken By, (c) = Cosigned By    Initials Name Provider Type    ISIDRA Rowley PTA Physical Therapy Assistant                Modalities       17 1010          Ice    Ice Applied Yes  -ISIDRA      Location let knee w/ IFC 20 min  -ISIDRA      Ice S/P Rx Yes  -KH       "ELECTRICAL STIMULATION    Attended/Unattended Unattended  -KH      Stimulation Type IFC  -KH      Location/Electrode Placement/Other Left knee w/ ICE  -KH      Rx Minutes 20 mins  -KH        User Key  (r) = Recorded By, (t) = Taken By, (c) = Cosigned By    Initials Name Provider Type    ISIDRA Rowley PTA Physical Therapy Assistant                Exercises       06/19/17 1010          Subjective Comments    Subjective Comments Pt reports that she has started to put weight on the leg not hurting as much but still really painful to WB  -KH      Subjective Pain    Able to rate subjective pain? yes  -KH      Pre-Treatment Pain Level 0  -KH      Post-Treatment Pain Level 0  -KH      Exercise 1    Exercise Name 1 pro ll level 3  -KH      Time (Minutes) 1 10'  -KH      Exercise 2    Exercise Name 2 incline stretch  -KH      Sets 2 3  -KH      Time (Seconds) 2 30  -KH      Exercise 3    Exercise Name 3 standing hamstring stretch  -KH      Sets 3 3  -KH      Time (Seconds) 3 30\"  -KH      Exercise 4    Exercise Name 4 CR/TR  -KH      Sets 4 2  -KH      Reps 4 10  -KH      Exercise 5    Exercise Name 5 mini squats  -KH      Sets 5 2  -KH      Reps 5 10  -KH      Exercise 6    Exercise Name 6 // bars gait with focus on heel toe   -KH      Time (Minutes) 6 5'  -KH      Exercise 7    Exercise Name 7 // bars w/ 1 crutch  -KH      Time (Minutes) 7 5'  -KH      Exercise 8    Exercise Name 8 cybex leg press  -KH      Sets 8 2  -KH      Reps 8 10  -KH      Time (Minutes) 8 60#  -KH      Exercise 9    Exercise Name 9 cybex hip abd  -KH      Sets 9 2  -KH      Reps 9 10  -KH      Time (Minutes) 9 30#  -KH      Exercise 10    Exercise Name 10 gait w/ 2 crutches focus on heel-toe  -KH      Time (Minutes) 10 150ft x1  -KH      Exercise 11    Exercise Name 11 quad sets   -KH      Sets 11 3  -KH      Reps 11 10  -KH      Time (Seconds) 11 5\"  -KH      Exercise 12    Exercise Name 12 SLR-FF  -KH      Sets 12 2  -KH      Reps 12 10  -KH      "   User Key  (r) = Recorded By, (t) = Taken By, (c) = Cosigned By    Initials Name Provider Type    ISIDRA Rowley PTA Physical Therapy Assistant                               PT OP Goals       06/19/17 1010       PT Short Term Goals    STG Date to Achieve 06/16/17  -     STG 1 Patient will be independent in home exercise program  -     STG 1 Progress Progressing  -KH     STG 2 Patient will have 120° flexion  -     STG 2 Progress Met  -KH     STG 3 Patient will independent straight leg raise without extensor lag  -     STG 3 Progress Met  -KH     STG 4 Patient will have 1 out of 10 resting pain  -     STG 4 Progress Met  -KH     STG 5 Patient will be able to increase weightbearing activities  -     STG 5 Progress Not Met  -     Long Term Goals    LTG Date to Achieve 07/07/17  -KH     LTG 1 Patient will be able to ambulate without an assistive device  -     LTG 1 Progress Not Met  -KH     LTG 2 Patient will have 135° flexion of the left knee  -     LTG 2 Progress Not Met  -KH     LTG 3 Patient have 0 resting pain  -     LTG 3 Progress Progressing  -KH     LTG 4 Patient will have minimal increase in swelling by end of day  -     LTG 4 Progress Progressing  -     LTG 5 Patient independent in exercise program  -     LTG 5 Progress Ongoing  -     Time Calculation    PT Goal Re-Cert Due Date 06/22/17  -       User Key  (r) = Recorded By, (t) = Taken By, (c) = Cosigned By    Initials Name Provider Type    ISIDRA Rowley PTA Physical Therapy Assistant                    Time Calculation:   Start Time: 1010  Stop Time: 1110  Time Calculation (min): 60 min  Total Timed Code Minutes- PT: 40 minute(s)    Therapy Charges for Today     Code Description Service Date Service Provider Modifiers Qty    52760563786 HC PT THER SUPP EA 15 MIN 6/19/2017 Cristina Rowley PTA GP 1    90139655635 HC PT ELECTRICAL STIM UNATTENDED 6/19/2017 Cristina Rowley PTA  1    64678932378 HC PT THER PROC EA 15 MIN 6/19/2017 Cristina  Amrik, PTA GP 3                    Cristina Rowley, PTA  6/19/2017

## 2017-06-21 ENCOUNTER — HOSPITAL ENCOUNTER (OUTPATIENT)
Dept: PHYSICAL THERAPY | Facility: HOSPITAL | Age: 62
Setting detail: THERAPIES SERIES
Discharge: HOME | End: 2017-06-21

## 2017-06-21 DIAGNOSIS — S82.832D CLOSED FRACTURE FIBULA, HEAD, LEFT, WITH ROUTINE HEALING, SUBSEQUENT ENCOUNTER: ICD-10-CM

## 2017-06-21 DIAGNOSIS — S83.512A LEFT ACL TEAR: Primary | ICD-10-CM

## 2017-06-21 PROCEDURE — 97110 THERAPEUTIC EXERCISES: CPT | Performed by: PHYSICAL THERAPIST

## 2017-06-21 NOTE — THERAPY PROGRESS REPORT/RE-CERT
"    Outpatient Physical Therapy Ortho Re-Assessment  [facility]     Patient Name: Silvia Watt  : 1955  MRN: [id number]  Today's Date: 2017      Visit Date: 2017     Pt attended  scheduled visits. .  Re-cert date 17  Pt will RTD 17  Pt has 30 total visits/ yr    There is no problem list on file for this patient.       History reviewed. No pertinent past medical history.     History reviewed. No pertinent surgical history.    Visit Dx:     ICD-10-CM ICD-9-CM   1. Left ACL tear S83.512A 844.2   2. Closed fracture fibula, head, left, with routine healing, subsequent encounter S82.832D V54.16                 PT Ortho       17 0800    Subjective Comments    Subjective Comments Pt relates not having much pain other than \"aching\" in lateral knee. States doing HEP each day, 2x/ day. 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   -    Precautions and Contraindications    Precautions DOI 17  -    Subjective Pain    Able to rate subjective pain? yes  -    Pre-Treatment Pain Level 0   \"aches\"in lateral knee - fibular  -    Posture/Observations    Posture/Observations Comments Pt presents with B crutches, neoprene wrap around hinged brace  -LF    ROM (Range of Motion)    General ROM Detail L knee AROM: ext -10, flexion 128.  -LF      17 1010    Subjective Pain    Post-Treatment Pain Level 0  -KH    Posture/Observations    Posture/Observations Comments gait w/ B crutches; decrease heel-toe pattern.  VC to increase.   -    ROM (Range of Motion)    General ROM Detail L AROM 0-122  -      User Key  (r) = Recorded By, (t) = Taken By, (c) = Cosigned By    Initials Name Provider Type    ISIDRA Rowley PTA Physical Therapy Assistant     Ya Queen, PT Physical Therapist                            Therapy Education       17 0900          Therapy " Education    Given HEP;Symptoms/condition management;Posture/body mechanics;Fall prevention and home safety;Mobility training  -LF      Program New  -LF      How Provided Verbal;Demonstration;Written  -LF      Provided to Patient  -LF      Level of Understanding Teach back education performed;Verbalized;Demonstrated  -LF        User Key  (r) = Recorded By, (t) = Taken By, (c) = Cosigned By    Initials Name Provider Type    LF Ya Queen, PT Physical Therapist                PT OP Goals       06/21/17 0900 06/21/17 0800    PT Short Term Goals    STG Date to Achieve 07/05/17  -LF 07/05/17  -LF    STG 1 Patient will be independent in home exercise program  -LF Patient will be independent in home exercise program  -LF    STG 1 Progress Progressing  -LF Progressing  -LF    STG 2 Pt will demonstrate full active and passive L knee extension.   -LF Patient will have 120° flexion  -LF    STG 2 Progress New  -LF Met  -LF    STG 3 Pt will demonstrate normalized knee ext in stance, with crutches fo rPWB as needed.   -LF Patient will independent straight leg raise without extensor lag  -LF    STG 3 Progress New  -LF Met  -LF    STG 4 Pt will perform sit<> stand transfer with symmetrical WB B LE.  -LF Patient will have 1 out of 10 resting pain  -LF    STG 4 Progress New  -LF Met  -LF    STG 5 Pt will report increased I in ADL performance.   -LF Patient will be able to increase weightbearing activities  -LF    STG 5 Progress New  -LF Not Met  -LF    Long Term Goals    LTG Date to Achieve 07/19/17  -LF 07/19/17  -LF    LTG 1 Patient will be able to ambulate without an assistive device  -LF Patient will be able to ambulate without an assistive device  -LF    LTG 1 Progress Progressing  -LF Not Met  -LF    LTG 2 Patient will have 125° flexion of the left knee  -LF Patient will have 135° flexion of the left knee  -LF    LTG 2 Progress Progressing  -LF Not Met  -LF    LTG 3 Pt will relate min c/o L knee pain with ADLs.   -LF Patient  have 0 resting pain  -LF    LTG 3 Progress Progressing  -LF Progressing  -LF    LTG 4 I in final HEP   -LF Patient will have minimal increase in swelling by end of day  -LF    LTG 4 Progress Ongoing  -LF Progressing  -LF    LTG 5 --  -LF Patient independent in exercise program  -LF    LTG 5 Progress --  -LF Ongoing  -LF    Time Calculation    PT Goal Re-Cert Due Date 07/12/17  -LF 07/12/17  -LF      User Key  (r) = Recorded By, (t) = Taken By, (c) = Cosigned By    Initials Name Provider Type     Ya Queen, PT Physical Therapist                PT Assessment/Plan       06/21/17 1100       PT Assessment    Functional Limitations Impaired gait;Impaired locomotion;Limitation in home management;Limitations in community activities;Performance in leisure activities;Performance in self-care ADL;Performance in work activities  -LF     Impairments Gait;Edema;Impaired flexibility;Pain;Muscle strength;Posture;Range of motion  -LF     Assessment Comments Pt progressing nicely. Pt demonstrtes functinal knee flexion, but needs to continue to stretch calf/ HS, knee to attain full ext, strengthen VMO,/ quad to allow normalized gait pattern before progressin b weight bearing . Pt understands new HEP parameters, progression guldelines. Gait improved with improved knee ext today. PT continued to demonstrate gait deviations, limited ROM, quad weakness, edema.Pt may benefit from continued skilled intervention to address the above mentioned deficits.   -LF     Rehab Potential Good  -LF     Patient/caregiver participated in establishment of treatment plan and goals Yes  -LF     Patient would benefit from skilled therapy intervention Yes  -LF     PT Plan    PT Frequency 2x/week  -LF     Predicted Duration of Therapy Intervention (days/wks) 4 weeks  -LF     Planned CPT's? PT RE-EVAL: 57045;PT THER PROC EA 15 MIN: 90590;PT THER ACT EA 15 MIN: 60314;PT NEUROMUSC RE-EDUCATION EA 15 MIN: 15178;PT MANUAL THERAPY EA 15 MIN: 15984;PT GAIT  "TRAINING EA 15 MIN: 19710;PT HOT OR COLD PACK TREAT MCARE;PT ELECTRICAL STIM UNATTEND: ;PT AQUATIC THERAPY EA 15 MIN: 21152  -LF     Physical Therapy Interventions (Optional Details) aquatics exercise;gait training;home exercise program;manual therapy techniques;modalities;neuromuscular re-education;patient/family education;ROM (Range of Motion);strengthening;stretching  -LF     PT Plan Comments Continue therapy involving education, strengthening, stretching, gait training, modalites as needed, progressive HEP instruction.   -LF       User Key  (r) = Recorded By, (t) = Taken By, (c) = Cosigned By    Initials Name Provider Type    LF Ya Queen, PT Physical Therapist                  Exercises       06/21/17 0800          Subjective Comments    Subjective Comments Pt relates not having much pain other than \"aching\" in lateral knee. States doing HEP each day, 2x/ day. [facility] is practicing walking with the crutches, progressing  weight bearing. [facility] is employed as , is continuing to work as needed.   -LF      Subjective Pain    Able to rate subjective pain? yes  -LF      Pre-Treatment Pain Level 0   \"aches\"in lateral knee - fibular  -LF      Exercise 1    Exercise Name 1 seated on table, quad sets with pillow  -LF      Cueing 1 Verbal;Demo  -LF      Sets 1 2  -LF      Reps 1 20   instruct, demo, perform, educate for HEP   -LF      Exercise 2    Exercise Name 2 seated calf stretch with towel  -LF      Cueing 2 Verbal;Auditory  -LF      Sets 2 2  -LF      Reps 2 10   instruct, demo, perform, educate for HEP   -LF      Exercise 3    Exercise Name 3 seated hamstring stretch with towel  -LF      Cueing 3 Verbal;Demo  -LF      Sets 3 1  -LF      Reps 3 10   instruct, demo, perform, educate for HEP   -LF      Exercise 4    Exercise Name 4 seated quad set, pillow under calf (strengthening to ext)   -LF      Cueing 4 Verbal;Demo  -LF      Sets 4 2  -LF      Reps 4 10   instruct, demo, perform, " educate for HEP   -LF      Exercise 5    Exercise Name 5 SLR 1) supine  2) seated   -LF      Cueing 5 Verbal  -LF      Sets 5 2  -LF      Reps 5 5   instruct, demo, perform, educate for HEP   -LF        User Key  (r) = Recorded By, (t) = Taken By, (c) = Cosigned By    Initials Name Provider Type    LF Ya Queen, PT Physical Therapist                              Outcome Measures       06/21/17 1100          Lower Extremity Functional Index    Any of your usual work, housework or school activities 1  -LF      Your usual hobbies, recreational or sporting activities 1  -LF      Getting into or out of the bath 2  -LF      Walking between rooms 3  -LF      Putting on your shoes or socks 3  -LF      Squatting 0  -LF      Lifting an object, like a bag of groceries from the floor 1  -LF      Performing light activities around your home 2  -LF      Performing heavy activities around your home 0  -LF      Getting into or out of a car 4  -LF      Walking 2 blocks 0  -LF      Walking a mile 0  -LF      Going up or down 10 stairs (about 1 flight of stairs) 0  -LF      Standing for 1 hour 1  -LF      Sitting for 1 hour 4  -LF      Running on even ground 0  -LF      Running on uneven ground 0  -LF      Making sharp turns while running fast 0  -LF      Hopping 0  -LF      Rolling over in bed 4  -LF      Total 26  -LF      Functional Assessment    Outcome Measure Options Lower Extremity Functional Scale (LEFS)  -LF        User Key  (r) = Recorded By, (t) = Taken By, (c) = Cosigned By    Initials Name Provider Type    LF Ya Queen, PT Physical Therapist            Time Calculation:   Start Time: 0850  Stop Time: 0940  Time Calculation (min): 50 min  Total Timed Code Minutes- PT: 50 minute(s)     Therapy Charges for Today     Code Description Service Date Service Provider Modifiers Qty    37084951329 HC PT THER PROC EA 15 MIN 6/21/2017 Ya Queen, PT GP 3          PT G-Codes  Outcome Measure Options: Lower Extremity  Functional Scale (LEFS)         Ya Queen, PT  6/21/2017

## 2017-06-26 ENCOUNTER — HOSPITAL ENCOUNTER (OUTPATIENT)
Dept: PHYSICAL THERAPY | Facility: HOSPITAL | Age: 62
Setting detail: THERAPIES SERIES
Discharge: HOME | End: 2017-06-26

## 2017-06-26 DIAGNOSIS — S83.512A LEFT ACL TEAR: Primary | ICD-10-CM

## 2017-06-26 DIAGNOSIS — S82.832D CLOSED FRACTURE FIBULA, HEAD, LEFT, WITH ROUTINE HEALING, SUBSEQUENT ENCOUNTER: ICD-10-CM

## 2017-06-26 PROCEDURE — 97110 THERAPEUTIC EXERCISES: CPT

## 2017-06-26 PROCEDURE — G0283 ELEC STIM OTHER THAN WOUND: HCPCS

## 2017-06-26 NOTE — THERAPY TREATMENT NOTE
Outpatient Physical Therapy Ortho Treatment Note  [facility]     Patient Name: Silvia Watt  : 1955  MRN: [id number]  Today's Date: 2017      Visit Date: 2017     Subjective Improvement: 25%  Attendance:  (/)  Next MD Visit: 2017  Recert date: 2017    Therapy Diagnosis: L ACL tear w/ closed tibia head      Visit Dx:    ICD-10-CM ICD-9-CM   1. Left ACL tear S83.512A 844.2   2. Closed fracture fibula, head, left, with routine healing, subsequent encounter S82.832D V54.16       There is no problem list on file for this patient.       No past medical history on file.     No past surgical history on file.          PT Ortho       17 1018    Posture/Observations    Posture/Observations Comments (P)  pt presents with B crutches w/ knee brace  -MO    ROM (Range of Motion)    General ROM Detail (P)  L knee AROM: ext 0 deg. flex 126 deg.  -MO      User Key  (r) = Recorded By, (t) = Taken By, (c) = Cosigned By    Initials Name Provider Type    SHADI Christine PTA Student PTA Student                            PT Assessment/Plan       17 1018       PT Assessment    Assessment Comments (P)  pt tolerated treatment well.  pt within normal ROM for knee flex/ext.  Pt improving and increased HEP to 3x/day instead of 2x/day.    -MO     PT Plan    PT Frequency (P)  2x/week  -MO     Predicted Duration of Therapy Intervention (days/wks) (P)  4 weeks  -MO     PT Plan Comments Work on gait and decrease crutch use as able. continue with POC.  Increase WB exercises.   -       User Key  (r) = Recorded By, (t) = Taken By, (c) = Cosigned By    Initials Name Provider Type    ISIDRA Rowley PTA Physical Therapy Assistant    SHADI Christine PTA Student PTA Student                Modalities       17 1018          Ice    Ice Applied (P)  Yes  -MO      Location (P)  L knee w/ IFC 20 min  -MO      Ice S/P Rx (P)  Yes  -MO      ELECTRICAL STIMULATION     "Attended/Unattended (P)  Unattended  -MO      Stimulation Type (P)  IFC  -MO      Location/Electrode Placement/Other (P)  L knee w/ ice  -MO      Rx Minutes (P)  20 mins  -MO        User Key  (r) = Recorded By, (t) = Taken By, (c) = Cosigned By    Initials Name Provider Type    SHADI Christine, PTA Student PTA Student                Exercises       06/26/17 1018          Subjective Comments    Subjective Comments (P)  Pt states she is not in any pain.  She says she has been feeling better and has increased her HEP to 3x/day.  She stilll gets an occational ache.  -MO      Subjective Pain    Able to rate subjective pain? (P)  yes  -MO      Pre-Treatment Pain Level (P)  1  -MO      Post-Treatment Pain Level (P)  1  -MO      Exercise 1    Exercise Name 1 (P)  Pro ll level 3  -MO      Time (Minutes) 1 (P)  10'  -MO      Exercise 2    Exercise Name 2 (P)  incline stretch  -MO      Sets 2 (P)  3  -MO      Time (Seconds) 2 (P)  30\"  -MO      Exercise 3    Exercise Name 3 (P)  standing hamstring stretch  -MO      Sets 3 (P)  3  -MO      Time (Seconds) 3 (P)  30\"  -MO      Exercise 4    Exercise Name 4 (P)  mini squats  -MO      Sets 4 (P)  2  -MO      Reps 4 (P)  10  -MO      Exercise 5    Exercise Name 5 (P)  SLR L leg  -MO      Sets 5 (P)  2  -MO      Reps 5 (P)  10  -MO      Exercise 6    Exercise Name 6 (P)  knee flex   -MO      Equipment 6 (P)  Theraband  -MO      Resistance 6 (P)  Red  -MO      Sets 6 (P)  2  -MO      Reps 6 (P)  10  -MO      Exercise 7    Exercise Name 7 (P)  hamstring curls  -MO      Equipment 7 (P)  Theraband  -MO      Resistance 7 (P)  Red  -MO      Sets 7 (P)  2  -MO      Reps 7 (P)  10  -MO      Exercise 8    Exercise Name 8 (P)  toe/heel raises  -MO      Sets 8 (P)  2  -MO      Reps 8 (P)  10  -MO      Exercise 9    Exercise Name 9 (P)  SAQ  -MO      Sets 9 (P)  2  -MO      Reps 9 (P)  10  -MO      Exercise 10    Exercise Name 10 (P)  standing TKE  -MO      Equipment 10 (P)  Theraband  -MO   "    Resistance 10 (P)  Red  -MO      Sets 10 (P)  2  -MO      Reps 10 (P)  10  -MO      Exercise 11    Exercise Name 11 (P)  Standing hamstring curls  -MO      Sets 11 (P)  2  -MO      Reps 11 (P)  10  -MO        User Key  (r) = Recorded By, (t) = Taken By, (c) = Cosigned By    Initials Name Provider Type    SHADI Christine, PTA Student PTA Student                               PT OP Goals       06/26/17 1018       PT Short Term Goals    STG Date to Achieve (P)  07/05/17  -MO     STG 1 (P)  Patient will be independent in home exercise program  -MO     STG 1 Progress (P)  Progressing  -MO     STG 2 (P)  Pt will demonstrate full active and passive L knee extension.   -MO     STG 2 Progress (P)  New  -MO     STG 3 (P)  Pt will demonstrate normalized knee ext in stance, with crutches fo rPWB as needed.   -MO     STG 3 Progress (P)  New  -MO     STG 4 (P)  Pt will perform sit<> stand transfer with symmetrical WB B LE.  -MO     STG 4 Progress (P)  New  -MO     STG 5 (P)  Pt will report increased I in ADL performance.   -MO     STG 5 Progress (P)  New  -MO     Long Term Goals    LTG Date to Achieve (P)  07/19/17  -MO     LTG 1 (P)  Patient will be able to ambulate without an assistive device  -MO     LTG 1 Progress (P)  Progressing  -MO     LTG 2 (P)  Patient will have 125° flexion of the left knee  -MO     LTG 2 Progress (P)  Progressing  -MO     LTG 3 (P)  Pt will relate min c/o L knee pain with ADLs.   -MO     LTG 3 Progress (P)  Progressing  -MO     LTG 4 (P)  I in final HEP   -MO     LTG 4 Progress (P)  Ongoing  -MO     LTG 5 (P)  Patient independent in exercise program  -MO     LTG 5 Progress (P)  Ongoing  -MO     Time Calculation    PT Goal Re-Cert Due Date (P)  07/12/17  -MO       User Key  (r) = Recorded By, (t) = Taken By, (c) = Cosigned By    Initials Name Provider Type    SHADI Christine, PTA Student PTA Student                    Time Calculation:   Start Time: (P) 1018  Stop Time: 1118  Time  Calculation (min): (P) 60 min  Total Timed Code Minutes- PT: 40 minute(s)                  Cristina Rowley, PTA  6/26/2017     Evin Christine, PTA Student 06/26/17 @ 1100

## 2017-06-28 ENCOUNTER — HOSPITAL ENCOUNTER (OUTPATIENT)
Dept: PHYSICAL THERAPY | Facility: HOSPITAL | Age: 62
Setting detail: THERAPIES SERIES
Discharge: HOME | End: 2017-06-28

## 2017-06-28 DIAGNOSIS — S83.512A LEFT ACL TEAR: Primary | ICD-10-CM

## 2017-06-28 DIAGNOSIS — S82.832D CLOSED FRACTURE FIBULA, HEAD, LEFT, WITH ROUTINE HEALING, SUBSEQUENT ENCOUNTER: ICD-10-CM

## 2017-06-28 PROCEDURE — 97110 THERAPEUTIC EXERCISES: CPT

## 2017-06-28 PROCEDURE — G0283 ELEC STIM OTHER THAN WOUND: HCPCS

## 2017-06-28 NOTE — THERAPY TREATMENT NOTE
Outpatient Physical Therapy Ortho Treatment Note  [facility]     Patient Name: Silvia Watt  : 1955  MRN: [id number]  Today's Date: 2017      Visit Date: 2017     Subjective Improvement: 35%  Attendance:   (/year)  Next MD Visit : 2017   Recert Date:  2017      Therapy Diagnosis:  L ACL tear w/ closed tibia head        Visit Dx:    ICD-10-CM ICD-9-CM   1. Left ACL tear S83.512A 844.2   2. Closed fracture fibula, head, left, with routine healing, subsequent encounter S82.832D V54.16       There is no problem list on file for this patient.       No past medical history on file.     No past surgical history on file.          PT Ortho       17 1010    Posture/Observations    Posture/Observations Comments (P)  pt presents w/ B crutches w/ L knee brace  -MO      17 1018    Posture/Observations    Posture/Observations Comments (P)  pt presents with B crutches w/ knee brace  -MO    ROM (Range of Motion)    General ROM Detail (P)  L knee AROM: ext 0 deg. flex 126 deg.  -MO      User Key  (r) = Recorded By, (t) = Taken By, (c) = Cosigned By    Initials Name Provider Type    SHADI Christine PTA Student PTA Student                            PT Assessment/Plan       17 1010       PT Assessment    Assessment Comments (P)  pt tolerated treatment well.  Toward the end there was a little pain when amb. w/ 1 crutch.  Added TKE w/ red theraband to HEP.  Also added amb. w/ 1 crutch instead of 2 as much as the pt can.  -MO     PT Plan    PT Frequency (P)  2x/week  -MO     Predicted Duration of Therapy Intervention (days/wks) (P)  4 weeks  -MO     PT Plan Comments (P)  Work on gait w/ 1 crutch (possibly w/o depending on pt pain).  Start progression to SLS.  -MO       User Key  (r) = Recorded By, (t) = Taken By, (c) = Cosigned By    Initials Name Provider Type    SHADI Christine PTA Student PTA Student                Modalities       17 1010           "Ice    Ice Applied (P)  Yes  -MO      Location (P)  L knee w/ IFC for 20 min  -MO      Ice S/P Rx (P)  Yes  -MO      ELECTRICAL STIMULATION    Attended/Unattended (P)  Unattended  -MO      Stimulation Type (P)  IFC  -MO      Location/Electrode Placement/Other (P)  L knee w/ ice  -MO      Rx Minutes (P)  20 mins  -MO        User Key  (r) = Recorded By, (t) = Taken By, (c) = Cosigned By    Initials Name Provider Type    SHADI Christine PTA Student PTA Student                Exercises       06/28/17 1010          Subjective Comments    Subjective Comments (P)  pt states she is \"feeling good today.\"  -MO      Subjective Pain    Able to rate subjective pain? (P)  yes  -MO      Pre-Treatment Pain Level (P)  1  -MO      Exercise 1    Exercise Name 1 (P)  Pro ll level 3.5  -MO      Time (Minutes) 1 (P)  10'  -MO      Exercise 2    Exercise Name 2 (P)  incline stretch  -MO      Sets 2 (P)  3  -MO      Time (Seconds) 2 (P)  30\"  -MO      Exercise 3    Exercise Name 3 (P)  standing hamstring stretch  -MO      Sets 3 (P)  3  -MO      Time (Seconds) 3 (P)  30\"  -MO      Exercise 4    Exercise Name 4 (P)  mini squats  -MO      Sets 4 (P)  2  -MO      Reps 4 (P)  10  -MO      Exercise 5    Exercise Name 5 (P)  heel toe raises  -MO      Sets 5 (P)  2  -MO      Reps 5 (P)  10  -MO      Exercise 6    Exercise Name 6 (P)  standing hamstring curls B  -MO      Equipment 6 (P)  Cuff Weight  -MO      Weights/Plates 6 (P)  2  -MO      Resistance 6 (P)  --  -MO      Sets 6 (P)  2  -MO      Reps 6 (P)  10  -MO      Exercise 7    Exercise Name 7 (P)  Standing TKE  -MO      Equipment 7 (P)  Theraband  -MO      Resistance 7 (P)  Red  -MO      Sets 7 (P)  2  -MO      Reps 7 (P)  10  -MO      Exercise 8    Exercise Name 8 (P)  step ups   -MO      Sets 8 (P)  2  -MO      Reps 8 (P)  10  -MO      Exercise 9    Exercise Name 9 (P)  lat step ups  -MO      Sets 9 (P)  2  -MO      Reps 9 (P)  10  -MO      Exercise 10    Exercise Name 10 (P)  airex " standing/shifting weight w/o holding on  -MO      Sets 10 (P)  1  -MO      Reps 10 (P)  --  -MO      Time (Minutes) 10 (P)  5'  -MO        User Key  (r) = Recorded By, (t) = Taken By, (c) = Cosigned By    Initials Name Provider Type    MO Harmeetceasaryaya Christine, PTA Student PTA Student                               PT OP Goals       06/28/17 1010       PT Short Term Goals    STG Date to Achieve (P)  07/05/17  -MO     STG 1 (P)  Patient will be independent in home exercise program  -MO     STG 1 Progress (P)  Progressing  -MO     STG 2 (P)  Pt will demonstrate full active and passive L knee extension.   -MO     STG 2 Progress (P)  New  -MO     STG 3 (P)  Pt will demonstrate normalized knee ext in stance, with crutches fo rPWB as needed.   -MO     STG 3 Progress (P)  New  -MO     STG 4 (P)  Pt will perform sit<> stand transfer with symmetrical WB B LE.  -MO     STG 4 Progress (P)  New  -MO     STG 5 (P)  Pt will report increased I in ADL performance.   -MO     STG 5 Progress (P)  New  -MO     Long Term Goals    LTG Date to Achieve (P)  07/19/17  -MO     LTG 1 (P)  Patient will be able to ambulate without an assistive device  -MO     LTG 1 Progress (P)  Progressing  -MO     LTG 2 (P)  Patient will have 125° flexion of the left knee  -MO     LTG 2 Progress (P)  Progressing  -MO     LTG 3 (P)  Pt will relate min c/o L knee pain with ADLs.   -MO     LTG 3 Progress (P)  Progressing  -MO     LTG 4 (P)  I in final HEP   -MO     LTG 4 Progress (P)  Ongoing  -MO     LTG 5 (P)  Patient independent in exercise program  -MO     LTG 5 Progress (P)  Ongoing  -MO     Time Calculation    PT Goal Re-Cert Due Date (P)  07/12/17  -MO       User Key  (r) = Recorded By, (t) = Taken By, (c) = Cosigned By    Initials Name Provider Type    SHADI Colemanmaytejavy Nanci, PTA Student PTA Student                    Time Calculation:   Start Time: (P) 1010  Stop Time: (P) 1102  Time Calculation (min): (P) 52 min  Total Timed Code Minutes- PT: (P) 52  minute(s)    Therapy Charges for Today     Code Description Service Date Service Provider Modifiers Qty    55125805697 HC PT THER SUPP EA 15 MIN 6/28/2017 Evin Christine, PTA Student GP 1    45351592018 HC PT ELECTRICAL STIM UNATTENDED 6/28/2017 Evin Christine PTA Student  1    32086613817 HC PT THER PROC EA 15 MIN 6/28/2017 Evin Christine PTA Student GP 2                    Evin Christine PTA Student  6/28/2017

## 2017-07-06 ENCOUNTER — HOSPITAL ENCOUNTER (OUTPATIENT)
Dept: PHYSICAL THERAPY | Facility: HOSPITAL | Age: 62
Setting detail: THERAPIES SERIES
Discharge: HOME | End: 2017-07-06

## 2017-07-06 DIAGNOSIS — S83.512A LEFT ACL TEAR: Primary | ICD-10-CM

## 2017-07-06 DIAGNOSIS — S82.832D CLOSED FRACTURE FIBULA, HEAD, LEFT, WITH ROUTINE HEALING, SUBSEQUENT ENCOUNTER: ICD-10-CM

## 2017-07-06 PROCEDURE — 97110 THERAPEUTIC EXERCISES: CPT

## 2017-07-06 PROCEDURE — 97116 GAIT TRAINING THERAPY: CPT

## 2017-07-06 NOTE — THERAPY TREATMENT NOTE
Outpatient Physical Therapy Ortho Treatment Note  [facility]     Patient Name: Silvia Watt  : 1955  MRN: [id number]  Today's Date: 2017      Visit Date: 2017     Subjective Improvement: 35%  Attendance:   (30 visits/ year)  Next MD Visit : 2017  Recert Date:  2017      Therapy Diagnosis:  L ACL tear w/ closed tibia head fracture        Visit Dx:    ICD-10-CM ICD-9-CM   1. Left ACL tear S83.512A 844.2   2. Closed fracture fibula, head, left, with routine healing, subsequent encounter S82.832D V54.16       There is no problem list on file for this patient.       No past medical history on file.     No past surgical history on file.          PT Ortho       17    Posture/Observations    Posture/Observations Comments (P)  pt presents w/ one axillary crutch.  Pt walking at normal speed and does not hesitate getting started.    -MO      User Key  (r) = Recorded By, (t) = Taken By, (c) = Cosigned By    Initials Name Provider Type    SHADI Christine, PTA Student PTA Student                            PT Assessment/Plan       17       PT Assessment    Assessment Comments (P)  Pt tolerated treatment well.  Pt had no pain throughout treatment.  Pt held on to rail minimally when performing exercises and ambulated around the gym w/o crutches.  Added walking around her house w/o her crutch and to use the crutch minimally when out, but bring it with her just in case.  -MO     PT Plan    PT Frequency (P)  2x/week  -MO     Predicted Duration of Therapy Intervention (days/wks) (P)  4 weeks  -MO     PT Plan Comments (P)  Work on the heel strike and fully extending the knee with gait.  Work on strength and balancing exercises for the knee.  -MO       User Key  (r) = Recorded By, (t) = Taken By, (c) = Cosigned By    Initials Name Provider Type    SHADI Christine, PTA Student PTA Student                    Exercises       17          Subjective  "Comments    Subjective Comments (P)  Pt states she is doing really good today and has no pain.  Pt states she is getting around with one crutch really well and is trying to go w/o the cane at home.  She states she is trying to go more and more w/o the knee brace.  -MO      Subjective Pain    Able to rate subjective pain? (P)  yes  -MO      Pre-Treatment Pain Level (P)  0  -MO      Post-Treatment Pain Level (P)  0  -MO      Exercise 1    Exercise Name 1 (P)  Pro ll level 4  -MO      Time (Minutes) 1 (P)  10'  -MO      Exercise 2    Exercise Name 2 (P)  incline stretch  -MO      Sets 2 (P)  3  -MO      Time (Seconds) 2 (P)  30\"  -MO      Exercise 3    Exercise Name 3 (P)  standing hamstring stretch  -MO      Sets 3 (P)  3  -MO      Time (Seconds) 3 (P)  30\"  -MO      Exercise 4    Exercise Name 4 (P)  airex mini squats  -MO      Sets 4 (P)  2  -MO      Reps 4 (P)  10  -MO      Exercise 5    Exercise Name 5 (P)  airex heel/toe raises  -MO      Sets 5 (P)  2  -MO      Reps 5 (P)  10  -MO      Exercise 6    Exercise Name 6 (P)  standing B hamstring curls  -MO      Equipment 6 (P)  Cuff Weight  -MO      Weights/Plates 6 (P)  2  -MO      Sets 6 (P)  3  -MO      Reps 6 (P)  10  -MO      Exercise 7    Exercise Name 7 (P)  Standing TKE  -MO      Equipment 7 (P)  Theraband  -MO      Resistance 7 (P)  Red  -MO      Sets 7 (P)  3  -MO      Reps 7 (P)  10  -MO      Exercise 8    Exercise Name 8 (P)  6\" step ups fwd/lat  -MO      Sets 8 (P)  2  -MO      Reps 8 (P)  10  -MO      Exercise 9    Exercise Name 9 (P)  fwd walking on foam beam  -MO      Sets 9 (P)  1  -MO      Reps 9 (P)  5  -MO      Time (Minutes) 9 (P)  --   back and forth  -MO      Exercise 10    Exercise Name 10 (P)  amb. w/o crutch  -MO      Reps 10 (P)  --   610ft.  -MO        User Key  (r) = Recorded By, (t) = Taken By, (c) = Cosigned By    Initials Name Provider Type    MO Osirisa [place], PTA Student PTA Student                               PT OP Goals       " 07/06/17 0918       PT Short Term Goals    STG Date to Achieve (P)  07/05/17  -MO     STG 1 (P)  Patient will be independent in home exercise program  -MO     STG 1 Progress (P)  Progressing  -MO     STG 2 (P)  Pt will demonstrate full active and passive L knee extension.   -MO     STG 2 Progress (P)  New  -MO     STG 3 (P)  Pt will demonstrate normalized knee ext in stance, with crutches fo rPWB as needed.   -MO     STG 3 Progress (P)  New  -MO     STG 4 (P)  Pt will perform sit<> stand transfer with symmetrical WB B LE.  -MO     STG 4 Progress (P)  New  -MO     STG 5 (P)  Pt will report increased I in ADL performance.   -MO     STG 5 Progress (P)  New  -MO     Long Term Goals    LTG Date to Achieve (P)  07/19/17  -MO     LTG 1 (P)  Patient will be able to ambulate without an assistive device  -MO     LTG 1 Progress (P)  Progressing  -MO     LTG 2 (P)  Patient will have 125° flexion of the left knee  -MO     LTG 2 Progress (P)  Progressing  -MO     LTG 3 (P)  Pt will relate min c/o L knee pain with ADLs.   -MO     LTG 3 Progress (P)  Progressing  -MO     LTG 4 (P)  I in final HEP   -MO     LTG 4 Progress (P)  Ongoing  -MO     LTG 5 (P)  Patient independent in exercise program  -MO     LTG 5 Progress (P)  Ongoing  -MO     Time Calculation    PT Goal Re-Cert Due Date (P)  07/12/17  -MO       User Key  (r) = Recorded By, (t) = Taken By, (c) = Cosigned By    Initials Name Provider Type    SHADI Christine PTA Student PTA Student                    Time Calculation:   Start Time: (P) 0918  Stop Time: (P) 0956  Time Calculation (min): (P) 38 min  Total Timed Code Minutes- PT: (P) 38 minute(s)    Therapy Charges for Today     Code Description Service Date Service Provider Modifiers Qty    71008750689 HC GAIT TRAINING EA 15 MIN 7/6/2017 Evin Christine PTA Student GP 1    80720523396 HC PT THER PROC EA 15 MIN 7/6/2017 Evin Christine PTA Student GP 2                    Evin Christine PTA Student  7/6/2017

## 2017-07-07 ENCOUNTER — APPOINTMENT (OUTPATIENT)
Dept: PHYSICAL THERAPY | Facility: HOSPITAL | Age: 62
End: 2017-07-07

## 2017-07-11 ENCOUNTER — HOSPITAL ENCOUNTER (OUTPATIENT)
Dept: PHYSICAL THERAPY | Facility: HOSPITAL | Age: 62
Setting detail: THERAPIES SERIES
Discharge: HOME | End: 2017-07-11

## 2017-07-11 DIAGNOSIS — S82.832D CLOSED FRACTURE FIBULA, HEAD, LEFT, WITH ROUTINE HEALING, SUBSEQUENT ENCOUNTER: ICD-10-CM

## 2017-07-11 DIAGNOSIS — S83.512A LEFT ACL TEAR: Primary | ICD-10-CM

## 2017-07-11 PROCEDURE — 97140 MANUAL THERAPY 1/> REGIONS: CPT

## 2017-07-11 PROCEDURE — 97116 GAIT TRAINING THERAPY: CPT

## 2017-07-11 PROCEDURE — G0283 ELEC STIM OTHER THAN WOUND: HCPCS

## 2017-07-11 PROCEDURE — 97110 THERAPEUTIC EXERCISES: CPT

## 2017-07-11 NOTE — THERAPY TREATMENT NOTE
Outpatient Physical Therapy Ortho Treatment Note  [facility]     Patient Name: Silvia Watt  : 1955  MRN: [id number]  Today's Date: 2017      Visit Date: 2017     Subjective Improvement: 50%  Attendance:  14/15 (30 visits/year)  Next MD Visit : 2017  Recert Date:  2017      Therapy Diagnosis:  L ACL tear w/ closed tibia head fracture        Visit Dx:    ICD-10-CM ICD-9-CM   1. Left ACL tear S83.512A 844.2   2. Closed fracture fibula, head, left, with routine healing, subsequent encounter S82.832D V54.16       There is no problem list on file for this patient.       No past medical history on file.     No past surgical history on file.          PT Ortho       17    Subjective Pain    Post-Treatment Pain Level (P)  0  -MO    Posture/Observations    Posture/Observations Comments (P)  Pt came in holding a L knee brace and crutch but not utilizing it in clinic. Pt not fully extending L knee when ambulating.  -MO      User Key  (r) = Recorded By, (t) = Taken By, (c) = Cosigned By    Initials Name Provider Type    SHADI Christine, PTA Student PTA Student                            PT Assessment/Plan       17       PT Assessment    Assessment Comments (P)  Pt tolerated treatment well.  Restarted estim this date secondary to slight ache in knee, but no pain.  Pt had slight pain w/ bosu step ups fwd and lat.  Pt had no pain with the rest of the treatment.  Pt liked the manual therapy and stated it felt like it loosened up the muscle for better gait/heel strike.  -MO     PT Plan    PT Frequency (P)  2x/week  -MO     Predicted Duration of Therapy Intervention (days/wks) (P)  4 weeks  -MO     PT Plan Comments (P)  Work on extension exercises.  Do manual on calf and hamstring as needed.  -MO       User Key  (r) = Recorded By, (t) = Taken By, (c) = Cosigned By    Initials Name Provider Type    SHADI Christine PTA Student PTA Student                 Modalities       07/11/17 0918          Ice    Ice Applied (P)  Yes  -MO      Location (P)  L knee w/ IFC 20 min w/ heel prop  -MO      Ice S/P Rx (P)  Yes  -MO      ELECTRICAL STIMULATION    Attended/Unattended (P)  Unattended  -MO      Stimulation Type (P)  IFC  -MO      Location/Electrode Placement/Other (P)  L knee w/ ice and heel prop  -MO      Rx Minutes (P)  20 mins  -MO        User Key  (r) = Recorded By, (t) = Taken By, (c) = Cosigned By    Initials Name Provider Type    MO Evin Nanci, PTA Student PTA Student                Exercises       07/11/17 0918          Subjective Comments    Subjective Comments (P)  Pt states she is stiff in her L knee and calf area, but no pain.  -MO      Subjective Pain    Able to rate subjective pain? (P)  yes  -MO      Pre-Treatment Pain Level (P)  0  -MO      Post-Treatment Pain Level (P)  0  -MO      Exercise 1    Exercise Name 1 (P)  Pro ll level 4  -MO      Time (Minutes) 1 (P)  10  -MO      Exercise 2    Exercise Name 2 (P)  incline stretch  -MO      Sets 2 (P)  3  -MO      Time (Seconds) 2 (P)  30  -MO      Exercise 3    Exercise Name 3 (P)  standing hamstring stretch  -MO      Sets 3 (P)  3  -MO      Time (Seconds) 3 (P)  30  -MO      Exercise 4    Exercise Name 4 (P)  airex mini squats  -MO      Sets 4 (P)  3  -MO      Reps 4 (P)  10  -MO      Exercise 5    Exercise Name 5 (P)  bosu heel/toe raises  -MO      Sets 5 (P)  3  -MO      Reps 5 (P)  10  -MO      Exercise 6    Exercise Name 6 (P)  Tband TKE  -MO      Equipment 6 (P)  Theraband  -MO      Weights/Plates 6 (P)  --  -MO      Resistance 6 (P)  Green  -MO      Sets 6 (P)  3  -MO      Reps 6 (P)  10  -MO      Exercise 7    Exercise Name 7 (P)  CC 4-way walks  -MO      Weights/Plates 7 (P)  3 Plates  -MO      Sets 7 (P)  1  -MO      Reps 7 (P)  10  -MO      Exercise 8    Exercise Name 8 (P)  bosu step ups fwd/lat  -MO      Sets 8 (P)  3  -MO      Reps 8 (P)  10  -MO      Exercise 9    Exercise Name 9 (P)  sit  to stand  -MO      Sets 9 (P)  2  -MO      Reps 9 (P)  10  -MO      Exercise 10    Exercise Name 10 (P)  ambulate w/o crutch or brace  -MO      Reps 10 (P)  --   270ft.  -MO        User Key  (r) = Recorded By, (t) = Taken By, (c) = Cosigned By    Initials Name Provider Type    SHADI Christine, PTA Student PTA Student                        Manual Rx (last 36 hours)      Manual Treatments       07/11/17 0918          Manual Rx 1    Manual Rx 1 Location (P)  L calf  -MO      Manual Rx 1 Type (P)  massage  -MO      Manual Rx 1 Duration (P)  10'  -MO        User Key  (r) = Recorded By, (t) = Taken By, (c) = Cosigned By    Initials Name Provider Type    MO Evin Christine, PTA Student PTA Student                PT OP Goals       07/11/17 0918       PT Short Term Goals    STG Date to Achieve (P)  07/05/17  -MO     STG 1 (P)  Patient will be independent in home exercise program  -MO     STG 1 Progress (P)  Progressing  -MO     STG 2 (P)  Pt will demonstrate full active and passive L knee extension.   -MO     STG 2 Progress (P)  New  -MO     STG 3 (P)  Pt will demonstrate normalized knee ext in stance, with crutches fo rPWB as needed.   -MO     STG 3 Progress (P)  New  -MO     STG 4 (P)  Pt will perform sit<> stand transfer with symmetrical WB B LE.  -MO     STG 4 Progress (P)  New  -MO     STG 5 (P)  Pt will report increased I in ADL performance.   -MO     STG 5 Progress (P)  New  -MO     Long Term Goals    LTG Date to Achieve (P)  07/19/17  -MO     LTG 1 (P)  Patient will be able to ambulate without an assistive device  -MO     LTG 1 Progress (P)  Progressing  -MO     LTG 2 (P)  Patient will have 125° flexion of the left knee  -MO     LTG 2 Progress (P)  Progressing  -MO     LTG 3 (P)  Pt will relate min c/o L knee pain with ADLs.   -MO     LTG 3 Progress (P)  Progressing  -MO     LTG 4 (P)  I in final HEP   -MO     LTG 4 Progress (P)  Ongoing  -MO     LTG 5 (P)  Patient independent in exercise program  -MO     LTG 5  Progress (P)  Ongoing  -MO     Time Calculation    PT Goal Re-Cert Due Date (P)  07/12/17  -MO       User Key  (r) = Recorded By, (t) = Taken By, (c) = Cosigned By    Initials Name Provider Type    SHADI Christine PTA Student PTA Student                    Time Calculation:   Start Time: (P) 0918  Stop Time: (P) 1040  Time Calculation (min): (P) 82 min  Total Timed Code Minutes- PT: (P) 82 minute(s)    Therapy Charges for Today     Code Description Service Date Service Provider Modifiers Qty    74666410888 HC PT THER SUPP EA 15 MIN 7/11/2017 Evin Christine, PTA Student GP 1    05288994462 HC PT ELECTRICAL STIM UNATTENDED 7/11/2017 Evin Christine PTA Student  1    32826305992 HC PT MANUAL THERAPY EA 15 MIN 7/11/2017 Evin Christine PTA Student GP 1    83579838074 HC GAIT TRAINING EA 15 MIN 7/11/2017 Evin Christine PTA Student GP 1    49811613414 HC PT THER PROC EA 15 MIN 7/11/2017 Evin Christine PTA Student GP 2                    Evin Christine PTA Student  7/11/2017

## 2017-07-13 ENCOUNTER — HOSPITAL ENCOUNTER (OUTPATIENT)
Dept: PHYSICAL THERAPY | Facility: HOSPITAL | Age: 62
Setting detail: THERAPIES SERIES
Discharge: HOME | End: 2017-07-13

## 2017-07-13 DIAGNOSIS — S83.512A LEFT ACL TEAR: Primary | ICD-10-CM

## 2017-07-13 DIAGNOSIS — S82.832D CLOSED FRACTURE FIBULA, HEAD, LEFT, WITH ROUTINE HEALING, SUBSEQUENT ENCOUNTER: ICD-10-CM

## 2017-07-13 PROCEDURE — 97110 THERAPEUTIC EXERCISES: CPT | Performed by: PHYSICAL THERAPIST

## 2017-07-13 NOTE — THERAPY PROGRESS REPORT/RE-CERT
Outpatient Physical Therapy Ortho Progress Note         [facility]      Patient Name: Silvia Watt  : 1955  MRN: [id number]  Today's Date: 2017      Visit Date: 2017  Subjective Improvement: 50%  Attendance: 15/16 (30 visits/year)  Next MD Visit : 2017  Recert Date:  2017    There is no problem list on file for this patient.       History reviewed. No pertinent past medical history.     History reviewed. No pertinent surgical history.    Visit Dx:     ICD-10-CM ICD-9-CM   1. Left ACL tear S83.512A 844.2   2. Closed fracture fibula, head, left, with routine healing, subsequent encounter S82.832D V54.16                 PT Ortho       17 09    ROM (Range of Motion)    General ROM Detail L knee AROM: 2-129 deg  -BS      17 0800    Subjective Comments    Subjective Comments pt reports 65% improvement with the left knee since her injury  -BS    Subjective Pain    Able to rate subjective pain? yes  -BS    Pre-Treatment Pain Level 1  -BS    Post-Treatment Pain Level 2  -BS      17 0918    Subjective Pain    Post-Treatment Pain Level (P)  0  -MO    Posture/Observations    Posture/Observations Comments (P)  Pt came in holding a L knee brace and crutch but not utilizing it in clinic. Pt not fully extending L knee when ambulating.  -MO      User Key  (r) = Recorded By, (t) = Taken By, (c) = Cosigned By    Initials Name Provider Type    BS Jose A Burden, PT Physical Therapist    SHADI Christine, PTA Student PTA Student                            Therapy Education       17 0900          Therapy Education    Given HEP;Symptoms/condition management;Posture/body mechanics;Fall prevention and home safety;Mobility training  -BS      Program Reinforced  -BS      How Provided Verbal;Demonstration;Written  -BS      Provided to Patient  -BS      Level of Understanding Demonstrated  -BS        User Key  (r) = Recorded By, (t) = Taken By, (c) = Cosigned By     Initials Name Provider Type    ANDREW Burden, PT Physical Therapist                PT OP Goals       07/13/17 0900 07/13/17 0800    PT Short Term Goals    STG Date to Achieve 07/05/17  -BS     STG 1 Patient will be independent in home exercise program  -BS     STG 1 Progress Progressing  -BS     STG 2 Pt will demonstrate full active and passive L knee extension.   -BS     STG 2 Progress Progressing  -BS     STG 2 Progress Comments -2 deg AROM  -BS     STG 3 Pt will demonstrate normalized knee ext in stance, with crutches fo rPWB as needed.   -BS     STG 3 Progress Met  -BS     STG 4 Pt will perform sit<> stand transfer with symmetrical WB B LE.  -BS     STG 4 Progress Met  -BS     STG 5 Pt will report increased I in ADL performance.   -BS     STG 5 Progress New  -BS     Long Term Goals    LTG Date to Achieve 07/19/17  -BS     LTG 1 Patient will be able to ambulate without an assistive device  -BS     LTG 1 Progress Met  -BS     LTG 2 Patient will have 125° flexion of the left knee  -BS     LTG 2 Progress Met  -BS     LTG 3 Pt will relate min c/o L knee pain with ADLs.   -BS     LTG 3 Progress Met  -BS     LTG 4 I in final HEP   -BS     LTG 4 Progress Ongoing  -BS     LTG 5 Patient independent in exercise program  -BS     LTG 5 Progress Ongoing  -BS     LTG 6 Improve LEFS score to 61 or greater  -BS     LTG 6 Progress New  -BS     Time Calculation    PT Goal Re-Cert Due Date 08/03/17  -BS 07/03/17  -BS      User Key  (r) = Recorded By, (t) = Taken By, (c) = Cosigned By    Initials Name Provider Type    ANDREW Burden, PT Physical Therapist                PT Assessment/Plan       07/13/17 1700       PT Assessment    Functional Limitations Impaired gait;Impaired locomotion;Limitation in home management;Limitations in community activities;Performance in leisure activities;Performance in self-care ADL;Performance in work activities  -BS     Impairments Gait;Impaired flexibility;Pain;Muscle strength;Posture;Range of  motion  -BS     Assessment Comments pt progressing toward or met all goals, limited by slight left knee ext ROM deficit  -BS     Please refer to paper survey for additional self-reported information Yes  -BS     Rehab Potential Good  -BS     Patient/caregiver participated in establishment of treatment plan and goals Yes  -BS     Patient would benefit from skilled therapy intervention Yes  -BS     PT Plan    PT Frequency 2x/week  -BS     Predicted Duration of Therapy Intervention (days/wks) 3 weeks  -BS     Planned CPT's? PT RE-EVAL: 02849;PT THER PROC EA 15 MIN: 51527;PT THER ACT EA 15 MIN: 36551;PT MANUAL THERAPY EA 15 MIN: 75694;PT NEUROMUSC RE-EDUCATION EA 15 MIN: 79415;PT GAIT TRAINING EA 15 MIN: 83599;PT ELECTRICAL STIM UNATTEND: ;PT ULTRASOUND EA 15 MIN: 54403;PT HOT/COLD PACK WC NONMCARE: 38619;PT THER SUPP EA 15 MIN  -BS     Physical Therapy Interventions (Optional Details) balance training;gait training;home exercise program;joint mobilization;manual therapy techniques;modalities;neuromuscular re-education;patient/family education;ROM (Range of Motion);stair training;strengthening;stretching  -BS     PT Plan Comments continue w/ gastroc/soleus stretching and left knee ext exercises  -BS       User Key  (r) = Recorded By, (t) = Taken By, (c) = Cosigned By    Initials Name Provider Type    ANDREW Burden, PT Physical Therapist                  Exercises       07/13/17 0800          Subjective Comments    Subjective Comments pt reports 65% improvement with the left knee since her injury  -BS      Subjective Pain    Able to rate subjective pain? yes  -BS      Pre-Treatment Pain Level 1  -BS      Post-Treatment Pain Level 2  -BS      Exercise 1    Exercise Name 1 Pro ll level 3  -BS      Time (Minutes) 1 10  -BS      Exercise 2    Exercise Name 2 incline stretch  -BS      Sets 2 3  -BS      Time (Seconds) 2 30  -BS      Exercise 3    Exercise Name 3 standing hamstring stretch  -BS      Exercise 4     "Exercise Name 4 airex mini squats  -BS      Sets 4 1  -BS      Reps 4 30  -BS      Exercise 5    Exercise Name 5 airex heel/toe raises  -BS      Sets 5 1  -BS      Reps 5 30  -BS      Exercise 8    Exercise Name 8 QS w/ heel prop  -BS      Sets 8 1  -BS      Reps 8 20  -BS      Exercise 9    Exercise Name 9 sit to stand  -BS      Sets 9 2  -BS      Reps 9 15  -BS      Exercise 10    Exercise Name 10 long sitting gastroc stretch  -BS      Sets 10 1  -BS      Reps 10 20  -BS      Time (Seconds) 10 5\"  -BS        User Key  (r) = Recorded By, (t) = Taken By, (c) = Cosigned By    Initials Name Provider Type    BS Jose A Burden, PT Physical Therapist                              Outcome Measures       07/13/17 0900          Lower Extremity Functional Index    Any of your usual work, housework or school activities 3  -BS      Your usual hobbies, recreational or sporting activities 3  -BS      Getting into or out of the bath 3  -BS      Walking between rooms 4  -BS      Putting on your shoes or socks 4  -BS      Squatting 3  -BS      Lifting an object, like a bag of groceries from the floor 4  -BS      Performing light activities around your home 4  -BS      Performing heavy activities around your home 3  -BS      Getting into or out of a car 4  -BS      Walking 2 blocks 3  -BS      Walking a mile 3  -BS      Going up or down 10 stairs (about 1 flight of stairs) 3  -BS      Standing for 1 hour 4  -BS      Sitting for 1 hour 4  -BS      Running on even ground 0  -BS      Running on uneven ground 0  -BS      Making sharp turns while running fast 0  -BS      Hopping 0  -BS      Rolling over in bed 4  -BS      Total 56  -BS      Functional Assessment    Outcome Measure Options Lower Extremity Functional Scale (LEFS)  -BS        User Key  (r) = Recorded By, (t) = Taken By, (c) = Cosigned By    Initials Name Provider Type    ANDREW Burden, PT Physical Therapist            Time Calculation:   Start Time: 0850  Stop Time: " 0930  Time Calculation (min): 40 min     Therapy Charges for Today     Code Description Service Date Service Provider Modifiers Qty    88440870020 HC PT THER PROC EA 15 MIN 7/13/2017 Jose A Burden, PT GP 3          PT G-Codes  Outcome Measure Options: Lower Extremity Functional Scale (LEFS)         Jose A Burden, PT  7/13/2017

## 2017-07-18 ENCOUNTER — HOSPITAL ENCOUNTER (OUTPATIENT)
Dept: PHYSICAL THERAPY | Facility: HOSPITAL | Age: 62
Setting detail: THERAPIES SERIES
Discharge: HOME | End: 2017-07-18

## 2017-07-18 DIAGNOSIS — S83.512A LEFT ACL TEAR: Primary | ICD-10-CM

## 2017-07-18 DIAGNOSIS — S82.832D CLOSED FRACTURE FIBULA, HEAD, LEFT, WITH ROUTINE HEALING, SUBSEQUENT ENCOUNTER: ICD-10-CM

## 2017-07-18 PROCEDURE — 97140 MANUAL THERAPY 1/> REGIONS: CPT

## 2017-07-18 PROCEDURE — 97110 THERAPEUTIC EXERCISES: CPT

## 2017-07-20 ENCOUNTER — HOSPITAL ENCOUNTER (OUTPATIENT)
Dept: PHYSICAL THERAPY | Facility: HOSPITAL | Age: 62
Setting detail: THERAPIES SERIES
Discharge: HOME | End: 2017-07-20

## 2017-07-20 DIAGNOSIS — S83.512A LEFT ACL TEAR: Primary | ICD-10-CM

## 2017-07-20 DIAGNOSIS — S82.832D CLOSED FRACTURE FIBULA, HEAD, LEFT, WITH ROUTINE HEALING, SUBSEQUENT ENCOUNTER: ICD-10-CM

## 2017-07-20 PROCEDURE — G0283 ELEC STIM OTHER THAN WOUND: HCPCS

## 2017-07-20 PROCEDURE — 97110 THERAPEUTIC EXERCISES: CPT

## 2017-07-20 NOTE — THERAPY TREATMENT NOTE
Outpatient Physical Therapy Ortho Treatment Note  [facility]     Patient Name: Silvia Watt  : 1955  MRN: [id number]  Today's Date: 2017      Visit Date: 2017     Subjective Improvement: 50%  Attendance:   (30/yr)  Next MD Visit : 17  Recert Date:  17      Therapy Diagnosis:  L ACL Tear        Visit Dx:    ICD-10-CM ICD-9-CM   1. Left ACL tear S83.512A 844.2   2. Closed fracture fibula, head, left, with routine healing, subsequent encounter S82.832D V54.16       There is no problem list on file for this patient.       No past medical history on file.     No past surgical history on file.          PT Ortho       17 0846    Posture/Observations    Posture/Observations Comments Pt presents w/ slight L knee flexion during gait and stance  -      17 0846    Posture/Observations    Posture/Observations Comments (P)  Pt presents with slight L knee flexion when ambulating and standing  -MO      User Key  (r) = Recorded By, (t) = Taken By, (c) = Cosigned By    Initials Name Provider Type    ISIDRA Rowley, PTA Physical Therapy Assistant    SHADI Christine PTA Student PTA Student                            PT Assessment/Plan       17 0846       PT Assessment    Assessment Comments Pt L knee gave out during third fwd R lunge. No other lunges were performed. Pt tolerated other exercises w/o increase in pain level. Left knee assessed after lunges and special test performed to check ACL, Meniscus and lateral medial ligaments of knee.  No postive testin gfound just slight tenderness in distal lateral hamstring. Pt felt less achy post treatment from what she was when she started therapy this date just some tightness in the hamstring.     -     PT Plan    PT Frequency 2x/week  -     Predicted Duration of Therapy Intervention (days/wks) 3 weeks  -     PT Plan Comments Monitor left knee after treatment this date. Continue to increase LE strength as able  both static and dynamic.  Recheck with PT next week with possible d/c at that time.   -KH       User Key  (r) = Recorded By, (t) = Taken By, (c) = Cosigned By    Initials Name Provider Type    ISIDRA Lujanie IZA Rowley Physical Therapy Assistant                Modalities       07/20/17 0846          Ice    Ice Applied Yes  -KH      Location L knee w/ IFC 20 min  -KH      Ice S/P Rx Yes  -KH      ELECTRICAL STIMULATION    Attended/Unattended Unattended  -KH      Stimulation Type IFC  -KH      Location/Electrode Placement/Other L knee w/ ice  -KH      Rx Minutes 20 mins  -KH        User Key  (r) = Recorded By, (t) = Taken By, (c) = Cosigned By    Initials Name Provider Type    ISIDRA Lujanie IZA Rowley Physical Therapy Assistant                Exercises       07/20/17 0846          Subjective Comments    Subjective Comments Pt states her L knee is aching but not painful.  Pt states when it does give her pain it is medial. Pt went back to work this week and has been up and down on it a lot.  Pt states she will start putting it up at work when not on it.  -KH      Subjective Pain    Able to rate subjective pain? yes  -KH      Pre-Treatment Pain Level 1  -KH      Post-Treatment Pain Level 0   pt states hamstring tight, aching better than beginning  -KH      Exercise 1    Exercise Name 1 Pro ll level 4  -KH      Time (Minutes) 1 10  -KH      Exercise 2    Exercise Name 2 incline stretch  -KH      Sets 2 3  -KH      Time (Seconds) 2 30  -KH      Exercise 3    Exercise Name 3 hamstring stretch  -KH      Sets 3 3  -KH      Time (Seconds) 3 30  -KH      Exercise 4    Exercise Name 4 lunge stretch  -KH      Sets 4 3  -KH      Time (Seconds) 4 30  -KH      Exercise 5    Exercise Name 5 bosu mini squats  -KH      Sets 5 3  -KH      Reps 5 30  -KH      Exercise 6    Exercise Name 6 standing TKE   -KH      Equipment 6 Theraband  -KH      Resistance 6 Blue  -KH      Sets 6 3  -KH      Reps 6 10  -KH      Exercise 7    Exercise Name 7 airex SLS  controlled knee hikes  -      Sets 7 2  -      Reps 7 10  -KH      Exercise 8    Exercise Name 8 lunges B fwd/lat  -      Sets 8 1  -      Reps 8 10  -KH      Exercise 9    Exercise Name 9 --  -      Sets 9 --  -      Reps 9 --  -        User Key  (r) = Recorded By, (t) = Taken By, (c) = Cosigned By    Initials Name Provider Type    ISIDRA Rowley PTA Physical Therapy Assistant                               PT OP Goals       07/20/17 0846       PT Short Term Goals    STG Date to Achieve 07/05/17  -     STG 1 Patient will be independent in home exercise program  -     STG 1 Progress Progressing  -     STG 2 Pt will demonstrate full active and passive L knee extension.   -     STG 2 Progress Progressing  -     STG 3 Pt will demonstrate normalized knee ext in stance, with crutches fo rPWB as needed.   -     STG 3 Progress Met  -     STG 4 Pt will perform sit<> stand transfer with symmetrical WB B LE.  -     STG 4 Progress Met  -     STG 5 Pt will report increased I in ADL performance.   -     STG 5 Progress New  -     Long Term Goals    LTG Date to Achieve 07/19/17  -     LTG 1 Patient will be able to ambulate without an assistive device  -     LTG 1 Progress Met  -     LTG 2 Patient will have 125° flexion of the left knee  -     LTG 2 Progress Met  -     LTG 3 Pt will relate min c/o L knee pain with ADLs.   -     LTG 3 Progress Met  -     LTG 4 I in final HEP   -     LTG 4 Progress Ongoing  -     LTG 5 Patient independent in exercise program  -     LTG 5 Progress Ongoing  -     LTG 6 Improve LEFS score to 61 or greater  -     LTG 6 Progress Not Met  -     Time Calculation    PT Goal Re-Cert Due Date 08/03/17  -       User Key  (r) = Recorded By, (t) = Taken By, (c) = Cosigned By    Initials Name Provider Type    ISIDRA Rowley PTA Physical Therapy Assistant                    Time Calculation:   Start Time: 0846  Stop Time: 0943  Time Calculation (min):  57 min  Total Timed Code Minutes- PT: 37 minute(s)    Therapy Charges for Today     Code Description Service Date Service Provider Modifiers Qty    11572697349 HC PT THER SUPP EA 15 MIN 7/20/2017 Cristina Rowley PTA GP 1    50878319595 HC PT ELECTRICAL STIM UNATTENDED 7/20/2017 Cristina Rowley, PTA  1    54647268313 HC PT THER PROC EA 15 MIN 7/20/2017 Cristina Rowley PTA GP 2                    Cristina Rowley PTA  7/20/2017   Evin Spence PTA Student 07/20/17

## 2017-07-24 ENCOUNTER — HOSPITAL ENCOUNTER (OUTPATIENT)
Dept: PHYSICAL THERAPY | Facility: HOSPITAL | Age: 62
Setting detail: THERAPIES SERIES
Discharge: HOME | End: 2017-07-24

## 2017-07-24 DIAGNOSIS — S83.512A LEFT ACL TEAR: Primary | ICD-10-CM

## 2017-07-24 DIAGNOSIS — S82.832D CLOSED FRACTURE FIBULA, HEAD, LEFT, WITH ROUTINE HEALING, SUBSEQUENT ENCOUNTER: ICD-10-CM

## 2017-07-24 PROCEDURE — G0283 ELEC STIM OTHER THAN WOUND: HCPCS

## 2017-07-24 PROCEDURE — 97110 THERAPEUTIC EXERCISES: CPT

## 2017-07-24 NOTE — THERAPY TREATMENT NOTE
Outpatient Physical Therapy Ortho Treatment Note  [facility]     Patient Name: Silvia Watt  : 1955  MRN: [id number]  Today's Date: 2017      Visit Date: 2017     Subjective Improvement: 50%  Attendance:   (30/year)  Next MD Visit : 2017  Recert Date:  2017      Therapy Diagnosis:  L ACL tear        Visit Dx:    ICD-10-CM ICD-9-CM   1. Left ACL tear S83.512A 844.2   2. Closed fracture fibula, head, left, with routine healing, subsequent encounter S82.832D V54.16       There is no problem list on file for this patient.       No past medical history on file.     No past surgical history on file.          PT Ortho       17 0839    Posture/Observations    Posture/Observations Comments (P)  Pt presents w/ slight L knee flexion during gait and stance  -MO      User Key  (r) = Recorded By, (t) = Taken By, (c) = Cosigned By    Initials Name Provider Type    SHADI Christine PTA Student PTA Student                            PT Assessment/Plan       17 0839       PT Assessment    Assessment Comments (P)  Pt presents w/ better knee extension w/ gait and stance.  Pt did not have an increase in pain throughout treatment.  Backed off of treatment some this date due to some instability last session,  -MO     PT Plan    PT Frequency (P)  2x/week  -MO     Predicted Duration of Therapy Intervention (days/wks) (P)  3 weeks  -MO     PT Plan Comments (P)  Continue strengthening L knee.  Work on ramp walks fwd/bwd next session.  -MO       User Key  (r) = Recorded By, (t) = Taken By, (c) = Cosigned By    Initials Name Provider Type    SHADI Christine PTA Student PTA Student                Modalities       17 0839          Subjective Pain    Post-Treatment Pain Level (P)  0  -MO      Ice    Ice Applied (P)  Yes  -MO      Location (P)  L knee w/ IFC 20 min  -MO      Ice S/P Rx (P)  Yes  -MO      ELECTRICAL STIMULATION    Attended/Unattended (P)  Unattended  -MO    "   Stimulation Type (P)  IFC  -MO      Location/Electrode Placement/Other (P)  L knee w/ ice  -MO      Rx Minutes (P)  20 mins  -MO        User Key  (r) = Recorded By, (t) = Taken By, (c) = Cosigned By    Initials Name Provider Type    SHADI Christine, PTA Student PTA Student                Exercises       07/24/17 0869          Subjective Comments    Subjective Comments (P)  Pt states she is aching today but no pain. Pt states it was a little sore after last treatment but no increased sweling out of the ordinary.   -MO      Subjective Pain    Able to rate subjective pain? (P)  yes  -MO      Pre-Treatment Pain Level (P)  1  -MO      Post-Treatment Pain Level (P)  0  -MO      Exercise 1    Exercise Name 1 (P)  Pro ll level 4  -MO      Time (Minutes) 1 (P)  10  -MO      Exercise 2    Exercise Name 2 (P)  incline stretch  -MO      Sets 2 (P)  3  -MO      Time (Seconds) 2 (P)  30  -MO      Exercise 3    Exercise Name 3 (P)  hamstring stretch  -MO      Sets 3 (P)  3  -MO      Time (Seconds) 3 (P)  30  -MO      Exercise 4    Exercise Name 4 (P)  standing lunge stretch  -MO      Sets 4 (P)  3  -MO      Time (Seconds) 4 (P)  30  -MO      Exercise 5    Exercise Name 5 (P)  mini squats  -MO      Sets 5 (P)  3  -MO      Reps 5 (P)  30  -MO      Exercise 6    Exercise Name 6 (P)  6\" step ups fwd/lat  -MO      Sets 6 (P)  3  -MO      Reps 6 (P)  10  -MO      Exercise 7    Exercise Name 7 (P)  CC 4-way walks  -MO      Weights/Plates 7 (P)  3 Plates  -MO      Sets 7 (P)  1  -MO      Reps 7 (P)  10  -MO      Exercise 8    Exercise Name 8 (P)  standing TKE  -MO      Equipment 8 (P)  Theraband  -MO      Weights/Plates 8 (P)  --  -MO      Resistance 8 (P)  Blue  -MO      Sets 8 (P)  2  -MO      Reps 8 (P)  20  -MO      Exercise 9    Exercise Name 9 (P)  --  -MO      Sets 9 (P)  --  -MO      Reps 9 (P)  --  -MO        User Key  (r) = Recorded By, (t) = Taken By, (c) = Cosigned By    Initials Name Provider Type    SHADI Cleary" Nanci, PTA Student PTA Student                               PT OP Goals       07/24/17 0839       PT Short Term Goals    STG Date to Achieve (P)  07/05/17  -MO     STG 1 (P)  Patient will be independent in home exercise program  -MO     STG 1 Progress (P)  Progressing  -MO     STG 2 (P)  Pt will demonstrate full active and passive L knee extension.   -MO     STG 2 Progress (P)  Progressing  -MO     STG 3 (P)  Pt will demonstrate normalized knee ext in stance, with crutches fo rPWB as needed.   -MO     STG 3 Progress (P)  Met  -MO     STG 4 (P)  Pt will perform sit<> stand transfer with symmetrical WB B LE.  -MO     STG 4 Progress (P)  Met  -MO     STG 5 (P)  Pt will report increased I in ADL performance.   -MO     STG 5 Progress (P)  Progressing  -MO     Long Term Goals    LTG Date to Achieve (P)  07/19/17  -MO     LTG 1 (P)  Patient will be able to ambulate without an assistive device  -MO     LTG 1 Progress (P)  Met  -MO     LTG 2 (P)  Patient will have 125° flexion of the left knee  -MO     LTG 2 Progress (P)  Met  -MO     LTG 3 (P)  Pt will relate min c/o L knee pain with ADLs.   -MO     LTG 3 Progress (P)  Met  -MO     LTG 4 (P)  I in final HEP   -MO     LTG 4 Progress (P)  Ongoing  -MO     LTG 5 (P)  Patient independent in exercise program  -MO     LTG 5 Progress (P)  Ongoing  -MO     LTG 6 (P)  Improve LEFS score to 61 or greater  -MO     LTG 6 Progress (P)  Not Met  -MO     Time Calculation    PT Goal Re-Cert Due Date (P)  08/03/17  -MO       User Key  (r) = Recorded By, (t) = Taken By, (c) = Cosigned By    Initials Name Provider Type    SHADI Christine PTA Student PTA Student                    Time Calculation:   Start Time: (P) 0839  Stop Time: (P) 0941  Time Calculation (min): (P) 62 min  Total Timed Code Minutes- PT: (P) 42 minute(s)    Therapy Charges for Today     Code Description Service Date Service Provider Modifiers Qty    35111731094 HC PT THER SUPP EA 15 MIN 7/24/2017 Evin Christine  PTA Student GP 1    26472106907 HC PT ELECTRICAL STIM UNATTENDED 7/24/2017 Evin Christine, PTA Student  1    32719880516 HC PT THER PROC EA 15 MIN 7/24/2017 Eivn Christine, PTA Student GP 3                    Evin Christine, PTA Student  7/24/2017

## 2017-07-26 ENCOUNTER — HOSPITAL ENCOUNTER (OUTPATIENT)
Dept: PHYSICAL THERAPY | Facility: HOSPITAL | Age: 62
Setting detail: THERAPIES SERIES
Discharge: HOME | End: 2017-07-26

## 2017-07-26 DIAGNOSIS — S83.512A LEFT ACL TEAR: Primary | ICD-10-CM

## 2017-07-26 DIAGNOSIS — S82.832D CLOSED FRACTURE FIBULA, HEAD, LEFT, WITH ROUTINE HEALING, SUBSEQUENT ENCOUNTER: ICD-10-CM

## 2017-07-26 PROCEDURE — 97110 THERAPEUTIC EXERCISES: CPT

## 2017-07-26 PROCEDURE — G0283 ELEC STIM OTHER THAN WOUND: HCPCS

## 2017-07-26 NOTE — THERAPY TREATMENT NOTE
Outpatient Physical Therapy Ortho Treatment Note  [facility]     Patient Name: Silvia Watt  : 1955  MRN: [id number]  Today's Date: 2017      Visit Date: 2017     Subjective Improvement: 50%  Attendance:    Next MD Visit : 17  Recert Date:  17      Therapy Diagnosis:  L ACL tear        Visit Dx:    ICD-10-CM ICD-9-CM   1. Left ACL tear S83.512A 844.2   2. Closed fracture fibula, head, left, with routine healing, subsequent encounter S82.832D V54.16       There is no problem list on file for this patient.       No past medical history on file.     No past surgical history on file.          PT Ortho       17 1426    Posture/Observations    Posture/Observations Comments (P)  pt presents w/ minimal L knee flexion during stance and gait   -MO      17 0839    Posture/Observations    Posture/Observations Comments (P)  Pt presents w/ slight L knee flexion during gait and stance  -MO      User Key  (r) = Recorded By, (t) = Taken By, (c) = Cosigned By    Initials Name Provider Type    SHADI Christine PTA Student PTA Student                            PT Assessment/Plan       17       PT Assessment    Assessment Comments (P)  Pt tolerated treatment well w/o increase in pain.  Pt little unstable walking up ramp bwd 1st try but not after that.   -MO     PT Plan    PT Frequency (P)  2x/week  -MO     Predicted Duration of Therapy Intervention (days/wks) (P)  3 weeks  -MO     PT Plan Comments (P)  Recheck with PT next week;  Continue as advised. Possible d/c to Independent management and fitness routine.   -MO       User Key  (r) = Recorded By, (t) = Taken By, (c) = Cosigned By    Initials Name Provider Type    SHADI Christine PTA Student PTA Student                Modalities       17 142          Ice    Ice Applied (P)  Yes  -MO      Location (P)  L knee w/ IFC 20 min  -MO      Ice S/P Rx (P)  Yes  -MO      ELECTRICAL STIMULATION     Attended/Unattended (P)  Unattended  -MO      Stimulation Type (P)  IFC  -MO      Location/Electrode Placement/Other (P)  L knee w/ ice  -MO      Rx Minutes (P)  20 mins  -MO        User Key  (r) = Recorded By, (t) = Taken By, (c) = Cosigned By    Initials Name Provider Type    SHADI Newelljavy Nanci, PTA Student PTA Student                Exercises       07/26/17 2829          Subjective Comments    Subjective Comments (P)  Pt states her pain is about a 1/10 since she has been up and down secondary to her job.  Pt states no increase in swelling out of the ordinary since last treatment.  -MO      Subjective Pain    Able to rate subjective pain? (P)  yes  -MO      Pre-Treatment Pain Level (P)  1  -MO      Post-Treatment Pain Level (P)  0  -MO      Exercise 1    Exercise Name 1 (P)  Pro ll level 4  -MO      Time (Minutes) 1 (P)  10  -MO      Exercise 2    Exercise Name 2 (P)  incline stretch  -MO      Sets 2 (P)  3  -MO      Time (Seconds) 2 (P)  30  -MO      Exercise 3    Exercise Name 3 (P)  hamstring stretch  -MO      Sets 3 (P)  3  -MO      Time (Seconds) 3 (P)  30  -MO      Exercise 4    Exercise Name 4 (P)  standing lunge stretch  -MO      Sets 4 (P)  3  -MO      Time (Seconds) 4 (P)  30  -MO      Exercise 5    Exercise Name 5 (P)  CC 4-way walks  -MO      Sets 5 (P)  1  -MO      Reps 5 (P)  10  -MO      Time (Minutes) 5 (P)  3 plates  -MO      Exercise 6    Exercise Name 6 (P)  TKE blue tband  -MO      Sets 6 (P)  2  -MO      Reps 6 (P)  20  -MO      Exercise 7    Exercise Name 7 (P)  ramp walks fwd/bwd  -MO      Sets 7 (P)  1  -MO      Reps 7 (P)  8  -MO      Exercise 8    Exercise Name 8 (P)  cybex leg press  -MO      Sets 8 (P)  3  -MO      Reps 8 (P)  10  -MO      Time (Minutes) 8 (P)  50#  -MO      Exercise 9    Exercise Name 9 (P)  cybex hip abd  -MO      Sets 9 (P)  2  -MO      Reps 9 (P)  10  -MO      Additional Comments (P)  30#  -MO        User Key  (r) = Recorded By, (t) = Taken By, (c) = Cosigned By     Initials Name Provider Type    SHADI Christine, PTA Student PTA Student                               PT OP Goals       07/26/17 1426       PT Short Term Goals    STG Date to Achieve (P)  07/05/17  -MO     STG 1 (P)  Patient will be independent in home exercise program  -MO     STG 1 Progress (P)  Progressing  -MO     STG 2 (P)  Pt will demonstrate full active and passive L knee extension.   -MO     STG 2 Progress (P)  Met  -MO     STG 3 (P)  Pt will demonstrate normalized knee ext in stance, with crutches fo rPWB as needed.   -MO     STG 3 Progress (P)  Met  -MO     STG 4 (P)  Pt will perform sit<> stand transfer with symmetrical WB B LE.  -MO     STG 4 Progress (P)  Met  -MO     STG 5 (P)  Pt will report increased I in ADL performance.   -MO     STG 5 Progress (P)  Met  -MO     Long Term Goals    LTG Date to Achieve (P)  07/19/17  -MO     LTG 1 (P)  Patient will be able to ambulate without an assistive device  -MO     LTG 1 Progress (P)  Met  -MO     LTG 2 (P)  Patient will have 125° flexion of the left knee  -MO     LTG 2 Progress (P)  Met  -MO     LTG 3 (P)  Pt will relate min c/o L knee pain with ADLs.   -MO     LTG 3 Progress (P)  Met  -MO     LTG 4 (P)  I in final HEP   -MO     LTG 4 Progress (P)  Ongoing  -MO     LTG 5 (P)  Patient independent in exercise program  -MO     LTG 5 Progress (P)  Ongoing  -MO     LTG 6 (P)  Improve LEFS score to 61 or greater  -MO     LTG 6 Progress (P)  Not Met  -MO     Time Calculation    PT Goal Re-Cert Due Date (P)  08/03/17  -MO       User Key  (r) = Recorded By, (t) = Taken By, (c) = Cosigned By    Initials Name Provider Type    SHADI Christine, PTA Student PTA Student                    Time Calculation:   Start Time: (P) 1426  Stop Time: (P) 1532  Time Calculation (min): (P) 66 min  Total Timed Code Minutes- PT: (P) 46 minute(s)    Therapy Charges for Today     Code Description Service Date Service Provider Modifiers Qty    99142405663 HC PT THER SUPP EA 15 MIN  7/26/2017 Evin Christine, PTA Student GP 1    88749884950 HC PT THER PROC EA 15 MIN 7/26/2017 Evin Christine, IZA Student GP 3    19866047660 HC PT ELECTRICAL STIM UNATTENDED 7/26/2017 Evin Christine PTA Student  1                    Evin Christine, IZA Student  7/26/2017

## 2017-07-31 ENCOUNTER — HOSPITAL ENCOUNTER (OUTPATIENT)
Dept: PHYSICAL THERAPY | Facility: HOSPITAL | Age: 62
Setting detail: THERAPIES SERIES
Discharge: HOME | End: 2017-07-31

## 2017-07-31 DIAGNOSIS — S83.512A LEFT ACL TEAR: Primary | ICD-10-CM

## 2017-07-31 PROCEDURE — 97112 NEUROMUSCULAR REEDUCATION: CPT

## 2017-07-31 PROCEDURE — 97110 THERAPEUTIC EXERCISES: CPT

## 2017-08-02 ENCOUNTER — HOSPITAL ENCOUNTER (OUTPATIENT)
Dept: PHYSICAL THERAPY | Facility: HOSPITAL | Age: 62
Setting detail: THERAPIES SERIES
Discharge: HOME | End: 2017-08-02

## 2017-08-02 DIAGNOSIS — S82.832D CLOSED FRACTURE FIBULA, HEAD, LEFT, WITH ROUTINE HEALING, SUBSEQUENT ENCOUNTER: ICD-10-CM

## 2017-08-02 DIAGNOSIS — S83.512A LEFT ACL TEAR: Primary | ICD-10-CM

## 2017-08-02 PROCEDURE — 97110 THERAPEUTIC EXERCISES: CPT

## 2017-08-02 NOTE — THERAPY DISCHARGE NOTE
Outpatient Physical Therapy Ortho Treatment Note/Discharge Summary  [facility]     Patient Name: Silvia Watt  : 1955  MRN: [id number]  Today's Date: 2017      Visit Date: 2017     Subjective Improvement: 75%  Attendance:   (30 visits per year)  Next MD Visit : PRn  Recert Date:  17 (d/c this date)      Therapy Diagnosis:  ACL tear; Tibia fracture        Visit Dx:    ICD-10-CM ICD-9-CM   1. Left ACL tear S83.512A 844.2   2. Closed fracture fibula, head, left, with routine healing, subsequent encounter S82.832D V54.16       There is no problem list on file for this patient.       No past medical history on file.     No past surgical history on file.          PT Ortho       17 0843    Posture/Observations    Posture/Observations Comments improved TKE with gait  -      17 0845    Subjective Pain    Able to rate subjective pain? yes  -AK    Pre-Treatment Pain Level 0  -AK    Post-Treatment Pain Level 0  -AK    ROM (Range of Motion)    General ROM Detail L Knee AROM=0-134 degrees, PROM=0-137 degrees  -AK    MMT (Manual Muscle Testing)    General MMT Assessment Detail L LE: Hip Flexion=4/5, Hip Extension=4+/5, Hip Anduction=4+/5, Hip ER=4-/5, Knee Flexion=4+/5, Knee Extension=4/5, Ankle DF+PF=5/5. R LE=WFL in all regards.  -AK      User Key  (r) = Recorded By, (t) = Taken By, (c) = Cosigned By    Initials Name Provider Type    ISIDRA Rowley, PTA Physical Therapy Assistant    JENNI Mack, PT Physical Therapist                            PT Assessment/Plan       17 0843       PT Assessment    Assessment Comments Reviewed all HEp this date. independent with all fitness routine and HEP.  Met all but one goal.    -     PT Plan    Predicted Duration of Therapy Intervention (days/wks) d/c  -     PT Plan Comments d/c to independent program and free month of fitness per pt request to save insurance visits.    -       User Key  (r) = Recorded By, (t) =  "Taken By, (c) = Cosigned By    Initials Name Provider Type    ISIDRA Rowley PTA Physical Therapy Assistant                    Exercises       08/02/17 0843          Subjective Comments    Subjective Comments No pain this morning no pain.  Just achy from being up and down on it all day yesterday.  Pt states she feels like she can continue on her own since work is about to start back and she wants to make sure and not use all of her therapy visit.   -KH      Subjective Pain    Able to rate subjective pain? yes  -KH      Pre-Treatment Pain Level 0  -KH      Post-Treatment Pain Level 0  -KH      Exercise 1    Exercise Name 1 Pro ll level 4  -KH      Time (Minutes) 1 10'  -KH      Exercise 2    Exercise Name 2 incline stretch  -KH      Sets 2 3  -KH      Time (Seconds) 2 30\"  -KH      Exercise 3    Exercise Name 3 standing hamstring stretch  -KH      Sets 3 3  -KH      Time (Seconds) 3 30\"  -KH      Exercise 4    Exercise Name 4 step ups fwd/lat  -KH      Sets 4 3  -KH      Reps 4 10  -KH      Additional Comments 6 inch  -KH      Exercise 5    Exercise Name 5 SLS  -KH      Sets 5 1  -KH      Reps 5 3  -KH      Time (Seconds) 5 15\"  -KH      Exercise 6    Exercise Name 6 cybex hip abd/add  -KH      Sets 6 3  -KH      Reps 6 10  -KH      Additional Comments 30# each  -KH      Exercise 7    Exercise Name 7 cybex leg press  -KH      Sets 7 3  -KH      Reps 7 10  -KH      Additional Comments 75#  -KH      Exercise 8    Exercise Name 8 sit to stands  -KH      Sets 8 2  -KH      Reps 8 10  -KH        User Key  (r) = Recorded By, (t) = Taken By, (c) = Cosigned By    Initials Name Provider Type    ISIDRA Rowley PTA Physical Therapy Assistant                               PT OP Goals       08/02/17 0843       PT Short Term Goals    STG Date to Achieve 07/05/17  -     STG 1 Patient will be independent in home exercise program  -     STG 1 Progress Met  -     STG 2 Pt will demonstrate full active and passive L knee " extension.   -     STG 2 Progress Met  -     STG 3 Pt will demonstrate normalized knee ext in stance, with crutches fo rPWB as needed.   -     STG 3 Progress Met  -     STG 4 Pt will perform sit<> stand transfer with symmetrical WB B LE.  -     STG 4 Progress Met  -     STG 5 Pt will report increased I in ADL performance.   -     STG 5 Progress Met  -     Long Term Goals    LTG Date to Achieve 07/19/17  -     LTG 1 Patient will be able to ambulate without an assistive device  -     LTG 1 Progress Met  -     LTG 2 Patient will have 125° flexion of the left knee  -     LTG 2 Progress Met  -     LTG 3 Pt will relate min c/o L knee pain with ADLs.   -     LTG 3 Progress Met  -     LTG 4 I in final HEP   -     LTG 4 Progress Met  -     LTG 5 Patient independent in exercise program  -     LTG 5 Progress Met  -     LTG 6 Improve LEFS score to 61 or greater  -     LTG 6 Progress Not Met  -     Time Calculation    PT Goal Re-Cert Due Date 08/03/17  -       User Key  (r) = Recorded By, (t) = Taken By, (c) = Cosigned By    Initials Name Provider Type    ISIDRA Rowley PTA Physical Therapy Assistant                    Outcome Measures       07/31/17 1100 07/31/17 0845       Lower Extremity Functional Index    Any of your usual work, housework or school activities  4  -AK     Your usual hobbies, recreational or sporting activities  3  -AK     Getting into or out of the bath  4  -AK     Walking between rooms  4  -AK     Putting on your shoes or socks  4  -AK     Squatting  3  -AK     Lifting an object, like a bag of groceries from the floor  4  -AK     Performing light activities around your home  4  -AK     Performing heavy activities around your home  3  -AK     Getting into or out of a car  4  -AK     Walking 2 blocks  3  -AK     Walking a mile  2  -AK     Going up or down 10 stairs (about 1 flight of stairs)  4  -AK     Standing for 1 hour  3  -AK     Sitting for 1 hour  4  -AK      Running on even ground  0  -AK     Running on uneven ground  0  -AK     Making sharp turns while running fast  0  -AK     Hopping  0  -AK     Rolling over in bed  4  -AK     Total  57  -AK     Functional Assessment    Outcome Measure Options Lower Extremity Functional Scale (LEFS)  -AK Lower Extremity Functional Scale (LEFS)  -AK       User Key  (r) = Recorded By, (t) = Taken By, (c) = Cosigned By    Initials Name Provider Type    JENNI Mack, PT Physical Therapist            Time Calculation:   Start Time: 0843  Stop Time: 0925  Time Calculation (min): 42 min  Total Timed Code Minutes- PT: 42 minute(s)    Therapy Charges for Today     Code Description Service Date Service Provider Modifiers Qty    99750477622 HC PT THER PROC EA 15 MIN 8/2/2017 Cristina Rowley PTA GP 3                       Cristina Rowley PTA  8/2/2017